# Patient Record
Sex: FEMALE | Race: WHITE | NOT HISPANIC OR LATINO | Employment: OTHER | URBAN - METROPOLITAN AREA
[De-identification: names, ages, dates, MRNs, and addresses within clinical notes are randomized per-mention and may not be internally consistent; named-entity substitution may affect disease eponyms.]

---

## 2017-06-28 ENCOUNTER — RECORDS - HEALTHEAST (OUTPATIENT)
Dept: LAB | Facility: CLINIC | Age: 72
End: 2017-06-28

## 2017-06-28 LAB
CHOLEST SERPL-MCNC: 207 MG/DL
FASTING STATUS PATIENT QL REPORTED: ABNORMAL
HDLC SERPL-MCNC: 68 MG/DL
LDLC SERPL CALC-MCNC: 121 MG/DL
TRIGL SERPL-MCNC: 89 MG/DL

## 2018-04-16 ENCOUNTER — RECORDS - HEALTHEAST (OUTPATIENT)
Dept: ADMINISTRATIVE | Facility: OTHER | Age: 73
End: 2018-04-16

## 2018-04-20 ENCOUNTER — RECORDS - HEALTHEAST (OUTPATIENT)
Dept: ADMINISTRATIVE | Facility: OTHER | Age: 73
End: 2018-04-20

## 2018-05-29 ENCOUNTER — RECORDS - HEALTHEAST (OUTPATIENT)
Dept: ADMINISTRATIVE | Facility: OTHER | Age: 73
End: 2018-05-29

## 2018-06-08 ENCOUNTER — RECORDS - HEALTHEAST (OUTPATIENT)
Dept: ADMINISTRATIVE | Facility: OTHER | Age: 73
End: 2018-06-08

## 2018-06-08 ENCOUNTER — RECORDS - HEALTHEAST (OUTPATIENT)
Dept: LAB | Facility: CLINIC | Age: 73
End: 2018-06-08

## 2018-06-08 LAB
ALBUMIN SERPL-MCNC: 4.5 G/DL (ref 3.5–5)
ALP SERPL-CCNC: 82 U/L (ref 45–120)
ALT SERPL W P-5'-P-CCNC: 14 U/L (ref 0–45)
ANION GAP SERPL CALCULATED.3IONS-SCNC: 12 MMOL/L (ref 5–18)
AST SERPL W P-5'-P-CCNC: 33 U/L (ref 0–40)
BILIRUB SERPL-MCNC: 0.5 MG/DL (ref 0–1)
BUN SERPL-MCNC: 16 MG/DL (ref 8–28)
CALCIUM SERPL-MCNC: 10.8 MG/DL (ref 8.5–10.5)
CHLORIDE BLD-SCNC: 96 MMOL/L (ref 98–107)
CHOLEST SERPL-MCNC: 210 MG/DL
CO2 SERPL-SCNC: 23 MMOL/L (ref 22–31)
CREAT SERPL-MCNC: 0.72 MG/DL (ref 0.6–1.1)
FASTING STATUS PATIENT QL REPORTED: ABNORMAL
GFR SERPL CREATININE-BSD FRML MDRD: >60 ML/MIN/1.73M2
GLUCOSE BLD-MCNC: 94 MG/DL (ref 70–125)
HDLC SERPL-MCNC: 85 MG/DL
LDLC SERPL CALC-MCNC: 111 MG/DL
POTASSIUM BLD-SCNC: 4.1 MMOL/L (ref 3.5–5)
PROT SERPL-MCNC: 8.2 G/DL (ref 6–8)
SODIUM SERPL-SCNC: 131 MMOL/L (ref 136–145)
T4 FREE SERPL-MCNC: 1.2 NG/DL (ref 0.7–1.8)
TRIGL SERPL-MCNC: 72 MG/DL
TSH SERPL DL<=0.005 MIU/L-ACNC: 0.63 UIU/ML (ref 0.3–5)

## 2018-06-14 LAB
B BURGDOR AB SER-IMP: NORMAL
LYME AB IGG BAND(S): NORMAL
LYME AB IGM BAND(S): NORMAL
LYME IGG BLOT: NEGATIVE
LYME IGM BLOT: NEGATIVE

## 2018-06-22 ENCOUNTER — RECORDS - HEALTHEAST (OUTPATIENT)
Dept: ADMINISTRATIVE | Facility: OTHER | Age: 73
End: 2018-06-22

## 2018-10-10 ENCOUNTER — RECORDS - HEALTHEAST (OUTPATIENT)
Dept: LAB | Facility: CLINIC | Age: 73
End: 2018-10-10

## 2018-10-10 LAB
ANION GAP SERPL CALCULATED.3IONS-SCNC: 7 MMOL/L (ref 5–18)
BUN SERPL-MCNC: 15 MG/DL (ref 8–28)
CALCIUM SERPL-MCNC: 10 MG/DL (ref 8.5–10.5)
CHLORIDE BLD-SCNC: 99 MMOL/L (ref 98–107)
CO2 SERPL-SCNC: 25 MMOL/L (ref 22–31)
CREAT SERPL-MCNC: 0.74 MG/DL (ref 0.6–1.1)
GFR SERPL CREATININE-BSD FRML MDRD: >60 ML/MIN/1.73M2
GLUCOSE BLD-MCNC: 82 MG/DL (ref 70–125)
POTASSIUM BLD-SCNC: 4.5 MMOL/L (ref 3.5–5)
SODIUM SERPL-SCNC: 131 MMOL/L (ref 136–145)

## 2019-01-18 ENCOUNTER — RECORDS - HEALTHEAST (OUTPATIENT)
Dept: ADMINISTRATIVE | Facility: OTHER | Age: 74
End: 2019-01-18

## 2019-02-08 ENCOUNTER — RECORDS - HEALTHEAST (OUTPATIENT)
Dept: ADMINISTRATIVE | Facility: OTHER | Age: 74
End: 2019-02-08

## 2019-03-21 ENCOUNTER — RECORDS - HEALTHEAST (OUTPATIENT)
Dept: ADMINISTRATIVE | Facility: OTHER | Age: 74
End: 2019-03-21

## 2019-03-22 ENCOUNTER — RECORDS - HEALTHEAST (OUTPATIENT)
Dept: ADMINISTRATIVE | Facility: OTHER | Age: 74
End: 2019-03-22

## 2019-03-25 ENCOUNTER — AMBULATORY - HEALTHEAST (OUTPATIENT)
Dept: VASCULAR SURGERY | Facility: CLINIC | Age: 74
End: 2019-03-25

## 2019-03-25 DIAGNOSIS — I87.2 VENOUS STASIS DERMATITIS: ICD-10-CM

## 2019-04-03 ENCOUNTER — OFFICE VISIT - HEALTHEAST (OUTPATIENT)
Dept: VASCULAR SURGERY | Facility: CLINIC | Age: 74
End: 2019-04-03

## 2019-04-03 ENCOUNTER — RECORDS - HEALTHEAST (OUTPATIENT)
Dept: VASCULAR ULTRASOUND | Facility: CLINIC | Age: 74
End: 2019-04-03

## 2019-04-03 DIAGNOSIS — L03.115 CELLULITIS OF RIGHT LEG: ICD-10-CM

## 2019-04-03 DIAGNOSIS — L03.115 CELLULITIS OF RIGHT LOWER LIMB: ICD-10-CM

## 2019-04-03 DIAGNOSIS — L97.811 VENOUS STASIS ULCER OF OTHER PART OF RIGHT LOWER LEG LIMITED TO BREAKDOWN OF SKIN WITH VARICOSE VEINS (H): ICD-10-CM

## 2019-04-03 DIAGNOSIS — I83.018 VARICOSE VEINS OF RIGHT LOWER EXTREMITY WITH ULCER OTHER PART OF LOWER LEG (CODE) (H): ICD-10-CM

## 2019-04-03 DIAGNOSIS — I87.2 VENOUS INSUFFICIENCY OF RIGHT LEG: ICD-10-CM

## 2019-04-03 DIAGNOSIS — L97.211 NON-PRESSURE CHRONIC ULCER OF RIGHT CALF LIMITED TO BREAKDOWN OF SKIN (H): ICD-10-CM

## 2019-04-03 DIAGNOSIS — I83.018 VENOUS STASIS ULCER OF OTHER PART OF RIGHT LOWER LEG LIMITED TO BREAKDOWN OF SKIN WITH VARICOSE VEINS (H): ICD-10-CM

## 2019-04-03 DIAGNOSIS — L97.211 VARICOSE VEINS OF RIGHT LOWER EXTREMITY WITH ULCER OF CALF LIMITED TO BREAKDOWN OF SKIN (H): ICD-10-CM

## 2019-04-03 DIAGNOSIS — I87.301 CHRONIC VENOUS HYPERTENSION, RIGHT: ICD-10-CM

## 2019-04-03 DIAGNOSIS — I83.012 VARICOSE VEINS OF RIGHT LOWER EXTREMITY WITH ULCER OF CALF LIMITED TO BREAKDOWN OF SKIN (H): ICD-10-CM

## 2019-04-03 DIAGNOSIS — L97.811 NON-PRESSURE CHRONIC ULCER OF OTHER PART OF RIGHT LOWER LEG LIMITED TO BREAKDOWN OF SKIN (H): ICD-10-CM

## 2019-04-03 DIAGNOSIS — I87.301 CHRONIC VENOUS HYPERTENSION (IDIOPATHIC) WITHOUT COMPLICATIONS OF RIGHT LOWER EXTREMITY: ICD-10-CM

## 2019-04-03 DIAGNOSIS — I83.012 VARICOSE VEINS OF RIGHT LOWER EXTREMITY WITH ULCER OF CALF (CODE) (H): ICD-10-CM

## 2019-04-03 DIAGNOSIS — I87.2 VENOUS INSUFFICIENCY (CHRONIC) (PERIPHERAL): ICD-10-CM

## 2019-04-03 ASSESSMENT — MIFFLIN-ST. JEOR: SCORE: 994.06

## 2019-04-07 LAB
BACTERIA SPEC CULT: ABNORMAL
BACTERIA SPEC CULT: ABNORMAL
GRAM STAIN RESULT: ABNORMAL
GRAM STAIN RESULT: ABNORMAL

## 2019-04-08 ENCOUNTER — AMBULATORY - HEALTHEAST (OUTPATIENT)
Dept: VASCULAR SURGERY | Facility: CLINIC | Age: 74
End: 2019-04-08

## 2019-04-08 ENCOUNTER — COMMUNICATION - HEALTHEAST (OUTPATIENT)
Dept: VASCULAR SURGERY | Facility: CLINIC | Age: 74
End: 2019-04-08

## 2019-04-08 DIAGNOSIS — Z22.322 MRSA (METHICILLIN RESISTANT STAPH AUREUS) CULTURE POSITIVE: ICD-10-CM

## 2019-04-08 DIAGNOSIS — B49 FUNGAL INFECTION: ICD-10-CM

## 2019-04-11 ENCOUNTER — COMMUNICATION - HEALTHEAST (OUTPATIENT)
Dept: VASCULAR SURGERY | Facility: CLINIC | Age: 74
End: 2019-04-11

## 2019-04-24 ENCOUNTER — OFFICE VISIT - HEALTHEAST (OUTPATIENT)
Dept: VASCULAR SURGERY | Facility: CLINIC | Age: 74
End: 2019-04-24

## 2019-04-24 DIAGNOSIS — L97.811 VENOUS STASIS ULCER OF OTHER PART OF RIGHT LOWER LEG LIMITED TO BREAKDOWN OF SKIN WITH VARICOSE VEINS (H): ICD-10-CM

## 2019-04-24 DIAGNOSIS — I83.018 VENOUS STASIS ULCER OF OTHER PART OF RIGHT LOWER LEG LIMITED TO BREAKDOWN OF SKIN WITH VARICOSE VEINS (H): ICD-10-CM

## 2019-04-24 DIAGNOSIS — L03.115 CELLULITIS OF RIGHT LEG: ICD-10-CM

## 2019-04-24 DIAGNOSIS — I83.012 VARICOSE VEINS OF RIGHT LOWER EXTREMITY WITH ULCER OF CALF LIMITED TO BREAKDOWN OF SKIN (H): ICD-10-CM

## 2019-04-24 DIAGNOSIS — I87.2 VENOUS INSUFFICIENCY OF RIGHT LEG: ICD-10-CM

## 2019-04-24 DIAGNOSIS — I87.301 CHRONIC VENOUS HYPERTENSION, RIGHT: ICD-10-CM

## 2019-04-24 DIAGNOSIS — M25.551 RIGHT HIP PAIN: ICD-10-CM

## 2019-04-24 DIAGNOSIS — R59.0 LYMPHADENOPATHY, INGUINAL: ICD-10-CM

## 2019-04-24 DIAGNOSIS — L97.211 VARICOSE VEINS OF RIGHT LOWER EXTREMITY WITH ULCER OF CALF LIMITED TO BREAKDOWN OF SKIN (H): ICD-10-CM

## 2019-04-24 ASSESSMENT — MIFFLIN-ST. JEOR: SCORE: 984.99

## 2019-04-30 ENCOUNTER — COMMUNICATION - HEALTHEAST (OUTPATIENT)
Dept: VASCULAR SURGERY | Facility: CLINIC | Age: 74
End: 2019-04-30

## 2019-05-29 ENCOUNTER — OFFICE VISIT - HEALTHEAST (OUTPATIENT)
Dept: VASCULAR SURGERY | Facility: CLINIC | Age: 74
End: 2019-05-29

## 2019-05-29 ENCOUNTER — RECORDS - HEALTHEAST (OUTPATIENT)
Dept: ADMINISTRATIVE | Facility: OTHER | Age: 74
End: 2019-05-29

## 2019-05-29 DIAGNOSIS — B49 FUNGAL INFECTION: ICD-10-CM

## 2019-05-29 DIAGNOSIS — I83.018 VENOUS STASIS ULCER OF OTHER PART OF RIGHT LOWER LEG LIMITED TO BREAKDOWN OF SKIN WITH VARICOSE VEINS (H): ICD-10-CM

## 2019-05-29 DIAGNOSIS — M25.551 RIGHT HIP PAIN: ICD-10-CM

## 2019-05-29 DIAGNOSIS — Z22.322 MRSA (METHICILLIN RESISTANT STAPH AUREUS) CULTURE POSITIVE: ICD-10-CM

## 2019-05-29 DIAGNOSIS — I87.2 VENOUS INSUFFICIENCY OF RIGHT LEG: ICD-10-CM

## 2019-05-29 DIAGNOSIS — L97.811 VENOUS STASIS ULCER OF OTHER PART OF RIGHT LOWER LEG LIMITED TO BREAKDOWN OF SKIN WITH VARICOSE VEINS (H): ICD-10-CM

## 2019-05-29 DIAGNOSIS — I87.301 CHRONIC VENOUS HYPERTENSION, RIGHT: ICD-10-CM

## 2019-05-29 DIAGNOSIS — I83.012 VARICOSE VEINS OF RIGHT LOWER EXTREMITY WITH ULCER OF CALF LIMITED TO BREAKDOWN OF SKIN (H): ICD-10-CM

## 2019-05-29 DIAGNOSIS — R59.0 LYMPHADENOPATHY, INGUINAL: ICD-10-CM

## 2019-05-29 DIAGNOSIS — L97.211 VARICOSE VEINS OF RIGHT LOWER EXTREMITY WITH ULCER OF CALF LIMITED TO BREAKDOWN OF SKIN (H): ICD-10-CM

## 2019-05-29 DIAGNOSIS — L03.115 CELLULITIS OF RIGHT LEG: ICD-10-CM

## 2019-05-29 ASSESSMENT — MIFFLIN-ST. JEOR: SCORE: 985.9

## 2019-06-04 ENCOUNTER — COMMUNICATION - HEALTHEAST (OUTPATIENT)
Dept: VASCULAR SURGERY | Facility: CLINIC | Age: 74
End: 2019-06-04

## 2019-07-02 ENCOUNTER — COMMUNICATION - HEALTHEAST (OUTPATIENT)
Dept: VASCULAR SURGERY | Facility: CLINIC | Age: 74
End: 2019-07-02

## 2019-07-24 ENCOUNTER — OFFICE VISIT - HEALTHEAST (OUTPATIENT)
Dept: VASCULAR SURGERY | Facility: CLINIC | Age: 74
End: 2019-07-24

## 2019-07-24 ENCOUNTER — RECORDS - HEALTHEAST (OUTPATIENT)
Dept: VASCULAR ULTRASOUND | Facility: CLINIC | Age: 74
End: 2019-07-24

## 2019-07-24 DIAGNOSIS — I83.891 SYMPTOMATIC VARICOSE VEINS OF RIGHT LOWER EXTREMITY: ICD-10-CM

## 2019-07-24 DIAGNOSIS — I87.2 VENOUS INSUFFICIENCY OF RIGHT LEG: ICD-10-CM

## 2019-07-24 DIAGNOSIS — I83.012 VARICOSE VEINS OF RIGHT LOWER EXTREMITY WITH ULCER OF CALF LIMITED TO BREAKDOWN OF SKIN (H): ICD-10-CM

## 2019-07-24 DIAGNOSIS — R59.0 LOCALIZED ENLARGED LYMPH NODES: ICD-10-CM

## 2019-07-24 DIAGNOSIS — L97.211 VARICOSE VEINS OF RIGHT LOWER EXTREMITY WITH ULCER OF CALF LIMITED TO BREAKDOWN OF SKIN (H): ICD-10-CM

## 2019-08-05 ENCOUNTER — COMMUNICATION - HEALTHEAST (OUTPATIENT)
Dept: VASCULAR SURGERY | Facility: CLINIC | Age: 74
End: 2019-08-05

## 2019-08-06 ENCOUNTER — COMMUNICATION - HEALTHEAST (OUTPATIENT)
Dept: SCHEDULING | Facility: CLINIC | Age: 74
End: 2019-08-06

## 2019-08-07 ENCOUNTER — COMMUNICATION - HEALTHEAST (OUTPATIENT)
Dept: VASCULAR SURGERY | Facility: CLINIC | Age: 74
End: 2019-08-07

## 2019-08-13 ENCOUNTER — COMMUNICATION - HEALTHEAST (OUTPATIENT)
Dept: VASCULAR SURGERY | Facility: CLINIC | Age: 74
End: 2019-08-13

## 2019-08-23 ENCOUNTER — RECORDS - HEALTHEAST (OUTPATIENT)
Dept: VASCULAR ULTRASOUND | Facility: CLINIC | Age: 74
End: 2019-08-23

## 2019-08-23 DIAGNOSIS — I83.891 VARICOSE VEINS OF RIGHT LOWER EXTREMITY WITH OTHER COMPLICATIONS: ICD-10-CM

## 2019-08-23 DIAGNOSIS — I83.012 VARICOSE VEINS OF RIGHT LOWER EXTREMITY WITH ULCER OF CALF (CODE) (H): ICD-10-CM

## 2019-08-23 DIAGNOSIS — L97.211 NON-PRESSURE CHRONIC ULCER OF RIGHT CALF LIMITED TO BREAKDOWN OF SKIN (H): ICD-10-CM

## 2019-08-23 DIAGNOSIS — I87.2 VENOUS INSUFFICIENCY (CHRONIC) (PERIPHERAL): ICD-10-CM

## 2019-08-28 ENCOUNTER — AMBULATORY - HEALTHEAST (OUTPATIENT)
Dept: VASCULAR SURGERY | Facility: CLINIC | Age: 74
End: 2019-08-28

## 2019-08-28 DIAGNOSIS — L98.491 CHRONIC SKIN ULCER, LIMITED TO BREAKDOWN OF SKIN (H): ICD-10-CM

## 2019-08-28 DIAGNOSIS — I87.2 VENOUS INSUFFICIENCY OF RIGHT LEG: ICD-10-CM

## 2019-08-30 ENCOUNTER — COMMUNICATION - HEALTHEAST (OUTPATIENT)
Dept: VASCULAR SURGERY | Facility: CLINIC | Age: 74
End: 2019-08-30

## 2019-09-04 ENCOUNTER — AMBULATORY - HEALTHEAST (OUTPATIENT)
Dept: VASCULAR SURGERY | Facility: CLINIC | Age: 74
End: 2019-09-04

## 2019-09-04 ENCOUNTER — RECORDS - HEALTHEAST (OUTPATIENT)
Dept: ADMINISTRATIVE | Facility: OTHER | Age: 74
End: 2019-09-04

## 2019-09-04 DIAGNOSIS — L98.491 CHRONIC SKIN ULCER, LIMITED TO BREAKDOWN OF SKIN (H): ICD-10-CM

## 2019-09-10 ENCOUNTER — OFFICE VISIT - HEALTHEAST (OUTPATIENT)
Dept: VASCULAR SURGERY | Facility: CLINIC | Age: 74
End: 2019-09-10

## 2019-09-10 DIAGNOSIS — I87.2 VENOUS INSUFFICIENCY OF RIGHT LEG: ICD-10-CM

## 2019-09-10 DIAGNOSIS — L97.211 VARICOSE VEINS OF RIGHT LOWER EXTREMITY WITH ULCER OF CALF LIMITED TO BREAKDOWN OF SKIN (H): ICD-10-CM

## 2019-09-10 DIAGNOSIS — I83.012 VARICOSE VEINS OF RIGHT LOWER EXTREMITY WITH ULCER OF CALF LIMITED TO BREAKDOWN OF SKIN (H): ICD-10-CM

## 2019-09-10 DIAGNOSIS — L98.491 CHRONIC SKIN ULCER, LIMITED TO BREAKDOWN OF SKIN (H): ICD-10-CM

## 2019-09-10 ASSESSMENT — MIFFLIN-ST. JEOR: SCORE: 972.29

## 2019-10-01 ENCOUNTER — OFFICE VISIT - HEALTHEAST (OUTPATIENT)
Dept: VASCULAR SURGERY | Facility: CLINIC | Age: 74
End: 2019-10-01

## 2019-10-01 ENCOUNTER — HEALTH MAINTENANCE LETTER (OUTPATIENT)
Age: 74
End: 2019-10-01

## 2019-10-01 DIAGNOSIS — I83.012 VARICOSE VEINS OF RIGHT LOWER EXTREMITY WITH ULCER OF CALF LIMITED TO BREAKDOWN OF SKIN (H): ICD-10-CM

## 2019-10-01 DIAGNOSIS — L98.491 CHRONIC SKIN ULCER, LIMITED TO BREAKDOWN OF SKIN (H): ICD-10-CM

## 2019-10-01 DIAGNOSIS — I87.301 CHRONIC VENOUS HYPERTENSION, RIGHT: ICD-10-CM

## 2019-10-01 DIAGNOSIS — Z22.322 MRSA (METHICILLIN RESISTANT STAPH AUREUS) CULTURE POSITIVE: ICD-10-CM

## 2019-10-01 DIAGNOSIS — L97.211 VARICOSE VEINS OF RIGHT LOWER EXTREMITY WITH ULCER OF CALF LIMITED TO BREAKDOWN OF SKIN (H): ICD-10-CM

## 2019-10-01 DIAGNOSIS — I83.891 SYMPTOMATIC VARICOSE VEINS OF RIGHT LOWER EXTREMITY: ICD-10-CM

## 2019-10-01 DIAGNOSIS — I87.2 VENOUS INSUFFICIENCY OF RIGHT LEG: ICD-10-CM

## 2019-10-02 LAB — KOH PREPARATION: NORMAL

## 2019-10-04 ENCOUNTER — COMMUNICATION - HEALTHEAST (OUTPATIENT)
Dept: VASCULAR SURGERY | Facility: CLINIC | Age: 74
End: 2019-10-04

## 2019-10-07 ENCOUNTER — COMMUNICATION - HEALTHEAST (OUTPATIENT)
Dept: VASCULAR SURGERY | Facility: CLINIC | Age: 74
End: 2019-10-07

## 2019-10-23 ENCOUNTER — OFFICE VISIT - HEALTHEAST (OUTPATIENT)
Dept: VASCULAR SURGERY | Facility: CLINIC | Age: 74
End: 2019-10-23

## 2019-10-23 DIAGNOSIS — I87.2 VENOUS INSUFFICIENCY OF RIGHT LEG: ICD-10-CM

## 2019-10-23 DIAGNOSIS — I83.891 SYMPTOMATIC VARICOSE VEINS OF RIGHT LOWER EXTREMITY: ICD-10-CM

## 2019-10-23 DIAGNOSIS — Z22.322 MRSA (METHICILLIN RESISTANT STAPH AUREUS) CULTURE POSITIVE: ICD-10-CM

## 2019-10-23 DIAGNOSIS — L98.491 CHRONIC SKIN ULCER, LIMITED TO BREAKDOWN OF SKIN (H): ICD-10-CM

## 2019-10-23 DIAGNOSIS — I83.012 VARICOSE VEINS OF RIGHT LOWER EXTREMITY WITH ULCER OF CALF LIMITED TO BREAKDOWN OF SKIN (H): ICD-10-CM

## 2019-10-23 DIAGNOSIS — L97.211 VARICOSE VEINS OF RIGHT LOWER EXTREMITY WITH ULCER OF CALF LIMITED TO BREAKDOWN OF SKIN (H): ICD-10-CM

## 2019-10-23 DIAGNOSIS — I87.301 CHRONIC VENOUS HYPERTENSION, RIGHT: ICD-10-CM

## 2019-11-14 ENCOUNTER — RECORDS - HEALTHEAST (OUTPATIENT)
Dept: LAB | Facility: CLINIC | Age: 74
End: 2019-11-14

## 2019-11-14 LAB
ANION GAP SERPL CALCULATED.3IONS-SCNC: 8 MMOL/L (ref 5–18)
BUN SERPL-MCNC: 14 MG/DL (ref 8–28)
CALCIUM SERPL-MCNC: 9.7 MG/DL (ref 8.5–10.5)
CHLORIDE BLD-SCNC: 97 MMOL/L (ref 98–107)
CO2 SERPL-SCNC: 25 MMOL/L (ref 22–31)
CREAT SERPL-MCNC: 0.71 MG/DL (ref 0.6–1.1)
GFR SERPL CREATININE-BSD FRML MDRD: >60 ML/MIN/1.73M2
GLUCOSE BLD-MCNC: 92 MG/DL (ref 70–125)
POTASSIUM BLD-SCNC: 5.1 MMOL/L (ref 3.5–5)
SODIUM SERPL-SCNC: 130 MMOL/L (ref 136–145)
T4 FREE SERPL-MCNC: 1.2 NG/DL (ref 0.7–1.8)
TSH SERPL DL<=0.005 MIU/L-ACNC: 0.17 UIU/ML (ref 0.3–5)

## 2019-12-15 ENCOUNTER — HEALTH MAINTENANCE LETTER (OUTPATIENT)
Age: 74
End: 2019-12-15

## 2020-01-22 ENCOUNTER — OFFICE VISIT - HEALTHEAST (OUTPATIENT)
Dept: VASCULAR SURGERY | Facility: CLINIC | Age: 75
End: 2020-01-22

## 2020-01-22 ENCOUNTER — RECORDS - HEALTHEAST (OUTPATIENT)
Dept: ADMINISTRATIVE | Facility: OTHER | Age: 75
End: 2020-01-22

## 2020-01-22 DIAGNOSIS — I83.891 SYMPTOMATIC VARICOSE VEINS OF RIGHT LOWER EXTREMITY: ICD-10-CM

## 2020-01-22 DIAGNOSIS — L98.491 CHRONIC SKIN ULCER, LIMITED TO BREAKDOWN OF SKIN (H): ICD-10-CM

## 2020-01-22 DIAGNOSIS — I87.2 VENOUS INSUFFICIENCY OF RIGHT LEG: ICD-10-CM

## 2020-02-05 ENCOUNTER — RECORDS - HEALTHEAST (OUTPATIENT)
Dept: ADMINISTRATIVE | Facility: OTHER | Age: 75
End: 2020-02-05

## 2020-11-19 ENCOUNTER — RECORDS - HEALTHEAST (OUTPATIENT)
Dept: LAB | Facility: CLINIC | Age: 75
End: 2020-11-19

## 2020-11-19 LAB
ALBUMIN SERPL-MCNC: 4.5 G/DL (ref 3.5–5)
ALP SERPL-CCNC: 117 U/L (ref 45–120)
ALT SERPL W P-5'-P-CCNC: 21 U/L (ref 0–45)
ANION GAP SERPL CALCULATED.3IONS-SCNC: 9 MMOL/L (ref 5–18)
AST SERPL W P-5'-P-CCNC: 33 U/L (ref 0–40)
BILIRUB SERPL-MCNC: 0.6 MG/DL (ref 0–1)
BUN SERPL-MCNC: 12 MG/DL (ref 8–28)
CALCIUM SERPL-MCNC: 9.5 MG/DL (ref 8.5–10.5)
CHLORIDE BLD-SCNC: 94 MMOL/L (ref 98–107)
CHOLEST SERPL-MCNC: 206 MG/DL
CO2 SERPL-SCNC: 24 MMOL/L (ref 22–31)
CREAT SERPL-MCNC: 0.69 MG/DL (ref 0.6–1.1)
FASTING STATUS PATIENT QL REPORTED: ABNORMAL
GFR SERPL CREATININE-BSD FRML MDRD: >60 ML/MIN/1.73M2
GLUCOSE BLD-MCNC: 95 MG/DL (ref 70–125)
HDLC SERPL-MCNC: 85 MG/DL
LDLC SERPL CALC-MCNC: 106 MG/DL
POTASSIUM BLD-SCNC: 4.2 MMOL/L (ref 3.5–5)
PROT SERPL-MCNC: 7.7 G/DL (ref 6–8)
SODIUM SERPL-SCNC: 127 MMOL/L (ref 136–145)
T4 FREE SERPL-MCNC: 1.1 NG/DL (ref 0.7–1.8)
TRIGL SERPL-MCNC: 74 MG/DL
TSH SERPL DL<=0.005 MIU/L-ACNC: 1.14 UIU/ML (ref 0.3–5)

## 2021-01-01 ENCOUNTER — HEALTH MAINTENANCE LETTER (OUTPATIENT)
Age: 76
End: 2021-01-01

## 2021-01-01 VITALS
WEIGHT: 118 LBS | DIASTOLIC BLOOD PRESSURE: 80 MMHG | HEART RATE: 76 BPM | BODY MASS INDEX: 21.76 KG/M2 | SYSTOLIC BLOOD PRESSURE: 120 MMHG | TEMPERATURE: 98.2 F

## 2021-01-01 VITALS
WEIGHT: 114.2 LBS | DIASTOLIC BLOOD PRESSURE: 82 MMHG | RESPIRATION RATE: 12 BRPM | HEART RATE: 72 BPM | TEMPERATURE: 98.9 F | HEIGHT: 62 IN | BODY MASS INDEX: 21.02 KG/M2 | SYSTOLIC BLOOD PRESSURE: 150 MMHG

## 2021-01-01 VITALS — HEIGHT: 62 IN | BODY MASS INDEX: 21.9 KG/M2 | WEIGHT: 119 LBS

## 2021-01-01 VITALS — BODY MASS INDEX: 21.53 KG/M2 | WEIGHT: 117 LBS | HEIGHT: 62 IN

## 2021-01-01 VITALS — BODY MASS INDEX: 21.57 KG/M2 | HEIGHT: 62 IN | WEIGHT: 117.2 LBS

## 2021-01-15 ENCOUNTER — HEALTH MAINTENANCE LETTER (OUTPATIENT)
Age: 76
End: 2021-01-15

## 2021-04-07 ENCOUNTER — RECORDS - HEALTHEAST (OUTPATIENT)
Dept: LAB | Facility: CLINIC | Age: 76
End: 2021-04-07

## 2021-04-07 LAB
ALBUMIN SERPL-MCNC: 4.5 G/DL (ref 3.5–5)
ALP SERPL-CCNC: 96 U/L (ref 45–120)
ALT SERPL W P-5'-P-CCNC: 18 U/L (ref 0–45)
ANION GAP SERPL CALCULATED.3IONS-SCNC: 13 MMOL/L (ref 5–18)
AST SERPL W P-5'-P-CCNC: 31 U/L (ref 0–40)
BILIRUB SERPL-MCNC: 0.3 MG/DL (ref 0–1)
BUN SERPL-MCNC: 17 MG/DL (ref 8–28)
CALCIUM SERPL-MCNC: 9.6 MG/DL (ref 8.5–10.5)
CHLORIDE BLD-SCNC: 98 MMOL/L (ref 98–107)
CO2 SERPL-SCNC: 24 MMOL/L (ref 22–31)
CREAT SERPL-MCNC: 0.75 MG/DL (ref 0.6–1.1)
ERYTHROCYTE [DISTWIDTH] IN BLOOD BY AUTOMATED COUNT: 12.1 % (ref 11–14.5)
GFR SERPL CREATININE-BSD FRML MDRD: >60 ML/MIN/1.73M2
GLUCOSE BLD-MCNC: 78 MG/DL (ref 70–125)
HCT VFR BLD AUTO: 36.8 % (ref 35–47)
HGB BLD-MCNC: 12.2 G/DL (ref 12–16)
MCH RBC QN AUTO: 31.3 PG (ref 27–34)
MCHC RBC AUTO-ENTMCNC: 33.2 G/DL (ref 32–36)
MCV RBC AUTO: 94 FL (ref 80–100)
PLATELET # BLD AUTO: 290 THOU/UL (ref 140–440)
PMV BLD AUTO: 9.6 FL (ref 8.5–12.5)
POTASSIUM BLD-SCNC: 3.9 MMOL/L (ref 3.5–5)
PROT SERPL-MCNC: 7.6 G/DL (ref 6–8)
RBC # BLD AUTO: 3.9 MILL/UL (ref 3.8–5.4)
SODIUM SERPL-SCNC: 135 MMOL/L (ref 136–145)
TSH SERPL DL<=0.005 MIU/L-ACNC: 0.36 UIU/ML (ref 0.3–5)
WBC: 11.1 THOU/UL (ref 4–11)

## 2021-04-08 ENCOUNTER — RECORDS - HEALTHEAST (OUTPATIENT)
Dept: ADMINISTRATIVE | Facility: OTHER | Age: 76
End: 2021-04-08

## 2021-04-08 LAB
PATIENT'S ETHNICITY: NORMAL
PATIENT'S RACE: NORMAL
SARS-COV-2 AB SERPL QL IA: NEGATIVE

## 2021-04-14 ENCOUNTER — HOSPITAL ENCOUNTER (OUTPATIENT)
Dept: NUCLEAR MEDICINE | Facility: HOSPITAL | Age: 76
Discharge: HOME | End: 2021-04-14
Attending: FAMILY MEDICINE

## 2021-04-14 ENCOUNTER — HOSPITAL ENCOUNTER (OUTPATIENT)
Dept: CARDIOLOGY | Facility: HOSPITAL | Age: 76
Discharge: HOME | End: 2021-04-14
Attending: FAMILY MEDICINE

## 2021-04-14 DIAGNOSIS — R06.02 SOB (SHORTNESS OF BREATH): ICD-10-CM

## 2021-04-14 LAB
CV STRESS CURRENT BP HE: NORMAL
CV STRESS CURRENT HR HE: 106
CV STRESS CURRENT HR HE: 106
CV STRESS CURRENT HR HE: 108
CV STRESS CURRENT HR HE: 109
CV STRESS CURRENT HR HE: 113
CV STRESS CURRENT HR HE: 115
CV STRESS CURRENT HR HE: 72
CV STRESS CURRENT HR HE: 78
CV STRESS CURRENT HR HE: 83
CV STRESS CURRENT HR HE: 85
CV STRESS CURRENT HR HE: 89
CV STRESS CURRENT HR HE: 91
CV STRESS CURRENT HR HE: 92
CV STRESS CURRENT HR HE: 92
CV STRESS CURRENT HR HE: 95
CV STRESS CURRENT HR HE: 96
CV STRESS DEVIATION TIME HE: NORMAL
CV STRESS ECHO PERCENT HR HE: NORMAL
CV STRESS EXERCISE STAGE HE: NORMAL
CV STRESS FINAL RESTING BP HE: NORMAL
CV STRESS FINAL RESTING HR HE: 85
CV STRESS MAX HR HE: 115
CV STRESS MAX TREADMILL GRADE HE: 0
CV STRESS MAX TREADMILL SPEED HE: 0
CV STRESS PEAK DIA BP HE: NORMAL
CV STRESS PEAK SYS BP HE: NORMAL
CV STRESS PHASE HE: NORMAL
CV STRESS PROTOCOL HE: NORMAL
CV STRESS RESTING PT POSITION HE: NORMAL
CV STRESS ST DEVIATION AMOUNT HE: NORMAL
CV STRESS ST DEVIATION ELEVATION HE: NORMAL
CV STRESS ST EVELATION AMOUNT HE: NORMAL
CV STRESS TEST TYPE HE: NORMAL
CV STRESS TOTAL STAGE TIME MIN 1 HE: NORMAL
NUC REST EJECTION FRACTION: 75 %
RATE PRESSURE PRODUCT: NORMAL
STRESS ECHO BASELINE DIASTOLIC HE: 95
STRESS ECHO BASELINE HR: 80
STRESS ECHO BASELINE SYSTOLIC BP: 218
STRESS ECHO CALCULATED PERCENT HR: 78 %
STRESS ECHO LAST STRESS DIASTOLIC BP: 91
STRESS ECHO LAST STRESS HR: 106
STRESS ECHO LAST STRESS SYSTOLIC BP: 186
STRESS ECHO TARGET HR: 147

## 2021-05-26 VITALS
RESPIRATION RATE: 16 BRPM | DIASTOLIC BLOOD PRESSURE: 80 MMHG | SYSTOLIC BLOOD PRESSURE: 138 MMHG | TEMPERATURE: 98.6 F | HEART RATE: 76 BPM

## 2021-05-26 VITALS — TEMPERATURE: 97.9 F | DIASTOLIC BLOOD PRESSURE: 62 MMHG | HEART RATE: 78 BPM | SYSTOLIC BLOOD PRESSURE: 126 MMHG

## 2021-05-26 VITALS — HEART RATE: 88 BPM | SYSTOLIC BLOOD PRESSURE: 140 MMHG | DIASTOLIC BLOOD PRESSURE: 80 MMHG | TEMPERATURE: 98 F

## 2021-05-27 NOTE — TELEPHONE ENCOUNTER
Called patient to let her know results from No's note below.      ----- Message from No Zuluaga NP sent at 4/8/2019  8:05 AM CDT -----  Can you call the patient and let her know that her culture grew out mrsa this is a type of bacteria that is resistant to several antibiotics; we will have to stop the keflex antibiotic and switch her to bactrim two times a day; she should continue her topical antibiotics as well. She also had fungus present so I also sent a prescription for clotrimazole cream; this is typically otc; but I sent an rx for it see if it will be covered; apply to the red and inflamed area but not any overt ulcerated areas.

## 2021-05-28 NOTE — TELEPHONE ENCOUNTER
"Pt called and stated that [employer] called her to inform her that her wound care supplies would not be covered because \"No did not fill out the paperwork\". Writer informed pt that the paperwork was filled but she has no open wounds so insurance will probably not cover the supplies.   "

## 2021-05-29 NOTE — PATIENT INSTRUCTIONS - HE
"We would like you to purchase a pair of the [employer] Walker brand stockings from [employer]. You can order them online or by calling the toll free number.  Your leg measurements are provided below and you will have to let the [employer] representative know these measurments to get the correct size for you .  Website: [email or web address]  Toll-free: [phone]  Hours: Mon-Thur 8:30am-8pm, Friday 8:30am-7pm, Sat 9am-4pm    Vasc Edema 4/3/2019   Right just above MTP 18.7   Right Ankle 20.5   Right Widest Calf 35   Left - just above MTP 19   Left Ankle 17.5   Left Widest Calf 29.3       Swelling in the legs can be caused by many reasons. No matter what the reason, treatment usually includes some type of compression. You should wear your compression socks as much as you can. Your compression should be put on first thing in the morning. Take the compression off at night. It is especially important to wear them with long periods of sitting/standing, long car rides or if you will be flying. Going without compression for even brief periods of time can be damaging to your legs and your health.  Compression socks should get replaced every 4-6 months. They do not need to be worn at night while in bed. Call us with any problems or questions. If you do a lot of standing, it is good to do calf raises to help keep the blood pumping. If you sit a lot at work, it is good to get up periodically to walk around. Elevation of the foot of your bed 4-6\" helps the blood return back to where it is needed.     Please call us if you have any questions [phone]    Thank you for choosing [facility].      No dressings; no ointments  Ok to shower      "

## 2021-05-29 NOTE — TELEPHONE ENCOUNTER
Pt wanted to know if she had to get the compression stockings from the [employer] catalog. Writer informed her that she can get them wherever she would like. Knee high.

## 2021-05-30 NOTE — TELEPHONE ENCOUNTER
Pt called and stated that she still has some R leg weeping. No open wounds. She is wearing compression stockings. She was wondering if she should see No again. Pt was seen on 5/29/19 and the same symptoms were present. Writer advised that she see Dr. Silveira on 7/24/19. Also asked her if she wanted to schedule sooner with No and she declined at this time.

## 2021-05-30 NOTE — PATIENT INSTRUCTIONS - HE
Varicose Vein Pre-Procedure Instructions    You are scheduled for a varicose vein treatment on your legs. The following is some helpful information for you in regards to your treatment.    **Important:  A  will be needed post procedure. We will supply a thigh high compression stocking for you unless if you have one.  Please be aware, it is not advised to fly within 3 weeks post procedure    Please wear comfortable clothing.  We recommend that you bring a change of under clothes; they may get stained by the cleansing solution.    Feel free to bring a personal music player or a CD to listen to during your procedure.    Take your routine medications as you normally would.    It is ok to eat prior to this procedure.    Please allow 1- 2 hours for your appointment.    For any questions regarding your procedure please call   [phone] to speak with the nurse.    If you would like a Good [employer] Estimate for your upcoming service/procedure contact Cost of Care Estimates at [phone], advocates are available Monday through Friday 8am - 5pm.    Please have the following information available:  1. Patient name and date of birth  2. Insurance company, plan name, ID and group numbers  3. Description of the service/procedure and the associated procedure code numbers, if available. If more than one (1) procedure code, indicate which will be the primary procedure code.   4. The facility where the service/procedure will be performed.  5. The name of the physician involved with the service/procedure.  6. Appointment date of service.  7. Telephone number to call with the information.    Continue previous wound care and compression.

## 2021-05-30 NOTE — PROGRESS NOTES
LK patient/right leg weeping/swelling/rash/symp vv/,USdone 4/3/19 right GSV RFA option,2 enlarged lymph nodes noted in right calf was to have repeat US in 3 months never f/u. >3 months compression socks.Insurance Medicare/[employer]. SYMP VV, WOUND RIGHT leg weeping areas improved. Swelling improved with compression. Complains of leg pain at a 5 sometimes worse and sharp when walking at vein area.Right ankle fracture 1963.Cast for 3 months.Also had a hip injury.  Only one lymph noted on right calf now . Reviewed US right GSV 4/19 RFA option will preauth.Continue wound care as NP ordered and compression.

## 2021-05-31 NOTE — PATIENT INSTRUCTIONS - HE
- Please call us if your compression wraps fall more than 1-2 inches below the bend of the knee. Call if they are too painful. Call if they get wet. If it is a weekend and the wraps fall down, are too painful, or get wet take the wraps off and put on another form of compression. Compression such as velcro wraps, compression stockings, short stretch bandages, or tubular compression. Apply one of these until you can be seen in clinic. Please call us if you have any questions [phone].    - Treatment:  Layered Compression Bandaging (2-layer)    What is it?  The layered compression bandaging has a layer of absorbent material that will soak up drainage.     Why we do it.   This is done to treat swelling, wounds, or both.  This will in turn help circulation and healing.    How to care for your bandages.  The wraps need to be kept dry. If  the wraps become wet, remove them and call the clinic to have another wrap applied.    What to expect.  It is common for the wraps to be uncomfortable at the beginning. The first two days are usually the hardest; then they will become more comfortable.       Elevating your legs will help the discomfort. Try to elevate your legs as much as possible.    If rest and elevation does not help your discomfort, call your provider.  If your provider is not available you can remove the wrap and leave a message for further instructions.    - Swelling and Compression Therapy    Swelling in the legs can be caused by many reasons. No matter what the reason, treatment usually includes some type of compression. This may be done with a support sock, dressing, ace wrap, or layered wraps.     It is important to treat the swelling for many reasons. If the swelling is not treated you may develop blisters that can lead to ulcerations. This is caused when extra fluid goes into tissue causing damage and blocking blood flow to the tissue.     It is important that you wear your compression every day,  including days that you will be seen in clinic.     Compression is often the most important part of treating leg wounds. Without controlling the swelling it is often not possible to heal wounds.     Going without compression for even brief periods of time can be damaging to your legs and your health.  Your compression should be put on first thing in the morning. Take the compression off at night only when instructed by your care provider to do so. Sometimes wearing compression 24 hours a day will be recommended.       If you are having difficulty wearing your compression it is important to notify your care provider so that other options may be reviewed.    Thank you for choosing [facility]. Please call us if you have any questions [phone].

## 2021-05-31 NOTE — PROGRESS NOTES
Clinic Care Coordination Contact    Clinic Care Coordination Contact  OUTREACH    Referral Information:  Referral Source: [employer] Outreach    Call to patient.  Her cyst has cleared up.  Discussed role of  and opportunities for support.   Current Behavioral Concerns: Patient's  has lived in memory care for 8 years at [facility].  She has support for this role. Her current stress is related to her son who is living with her after losing his housing after getting a DUI. He is depressed and seeing a psychiatrist and is on medications. She had to get him [facility] support and is on MA and SNAP.  She let him move in with her as he was homeless.  This is not good for him or her, but until he can get his life on track, she will allow it.  He is dealing with legal consequences.  She has talked with JACIEL. Is trying to set boundaries, yet support him.    Education Provided to patient: Reviewed role of .       Advance Care Plan/Directive  Advanced Care Plans/Directives on file:: No    Patient/Caregiver understanding: Patient appreciated the call and would like to call SW if needs arise.       Plan: SW will send letter with information.  No further outreach by BRENDAN.      SONIA Flynn  [facility]  [phone]

## 2021-05-31 NOTE — TELEPHONE ENCOUNTER
Jen called back with questions regarding her RFA procedure on Wednesday 8/21. Went over instructions. She is wondering if she can bring her own thigh high stocking. Let her know she can but that we will also have them in clinic.

## 2021-05-31 NOTE — TELEPHONE ENCOUNTER
Pt called and left a message requesting a call back from Moisés. Writer left pt a message to call back with any questions- Moisés is not in today.

## 2021-05-31 NOTE — TELEPHONE ENCOUNTER
Left message  patient regarding vein ablation procedure next week. Advised to bring a . Explained it is ok to eat and drink prior to procedure with exception of blood thinner. We will supply patient with a thigh high compression sock. We carry from size medium to extra large.If patient doesn't fit into them they will need to provide their own. . Call if any further questions.

## 2021-05-31 NOTE — TELEPHONE ENCOUNTER
One of the incisions sites from her RFA is sore - advised her to take tylenol/ibu and ice the area, along with elevating her legs.

## 2021-05-31 NOTE — TELEPHONE ENCOUNTER
Pt wanted to know when she would be able to schedule the ablation. Writer informed her that the PA was submitted on 7/30/19 and the office has up to 30 days for prior authorizations. She will be called to scheduled if she is approved.

## 2021-06-01 NOTE — PATIENT INSTRUCTIONS - HE
Resume normal activity with exception of no flight for one more week.  Use compression socks as much as possible when on feet, long car rides and on air planes.  Return to clinic in 4 months.  Call if any questions [phone]   Continue daily wound care of bactroban/abd/compression sock.   Wash with saline and pat dry.

## 2021-06-01 NOTE — PATIENT INSTRUCTIONS - HE
Sent skin sample for yeast testing    Sent culture of the wound; this will take 5 days to get results we will call you with next steps    Sent refills to your pharmacy for Triamcinolone ointment and Bactroban ointment      Wound Care Instructions    bid Cleanse your right medial ankle wound(s) with Normal Saline     Pat Dry    Apply mixture of Triamcinolone ointment and bactroban ointment into/onto the wounds    Cover with viscopaste; ABD    Secure with roll gauze as needed    Compression: tubular compression and short stretch to the right    It is not ok to get your wound wet in the bath or shower; cover with saran wrap; do wound cares right after showering    SEEK MEDICAL CARE IF:    You have an increase in swelling, pain, or redness around the wound.    You have an increase in the amount of pus coming from the wound.    There is a bad smell coming from the wound.    The wound appears to be worsening/enlarging    You have a fever greater than 101.5 F      No Zuluaga DNP, RN, CNP, [facility]  [facility]  [phone]

## 2021-06-01 NOTE — PROGRESS NOTES
2 week f/u right GSV RFA. Wound continues.Comes in with ace wrap entire leg and bactroban and gauze over wounds. Patient refuses compression 2 layer made leg itch. Cotton cast padding did help. Dressings removed. Wounds cleansed and pat dry. Applied bactroban,abd,roll gauze and compression.  Advised to continue compression knee high and resume normal activity. RTC with NP in 3 weeks and KW in 4 months.

## 2021-06-01 NOTE — PATIENT INSTRUCTIONS - HE
- Please call us if your compression wraps fall more than 1-2 inches below the bend of the knee. Remove the wraps if you experience any shortness of breathe or notice your toes turning blue/purple and then give us a call. Call if they are too painful. Call if they get wet. If it is a weekend and the wraps fall down, are too painful, or get wet take the wraps off and put on another form of compression. Compression such as velcro wraps, compression stockings, short stretch bandages, or tubular compression. Apply one of these until you can be seen in clinic. Please call us if you have any questions [phone].    - Treatment:  Layered Compression Bandaging (2-layer)    What is it?  The layered compression bandaging has a layer of absorbent material that will soak up drainage.     Why we do it.   This is done to treat swelling, wounds, or both.  This will in turn help circulation and healing.    How to care for your bandages.  The wraps need to be kept dry. If  the wraps become wet, remove them and call the clinic to have another wrap applied.    What to expect.  It is common for the wraps to be uncomfortable at the beginning. The first two days are usually the hardest; then they will become more comfortable.       Elevating your legs will help the discomfort. Try to elevate your legs as much as possible.    If rest and elevation does not help your discomfort, call your provider.  If your provider is not available you can remove the wrap and leave a message for further instructions.    - Swelling and Compression Therapy    Swelling in the legs can be caused by many reasons. No matter what the reason, treatment usually includes some type of compression. This may be done with a support sock, dressing, ace wrap, or layered wraps.     It is important to treat the swelling for many reasons. If the swelling is not treated you may develop blisters that can lead to ulcerations. This is caused when extra fluid goes into tissue  causing damage and blocking blood flow to the tissue.     It is important that you wear your compression every day, including days that you will be seen in clinic.     Compression is often the most important part of treating leg wounds. Without controlling the swelling it is often not possible to heal wounds.     Going without compression for even brief periods of time can be damaging to your legs and your health.  Your compression should be put on first thing in the morning. Take the compression off at night only when instructed by your care provider to do so. Sometimes wearing compression 24 hours a day will be recommended.       If you are having difficulty wearing your compression it is important to notify your care provider so that other options may be reviewed.    Thank you for choosing [facility]. Please call us if you have any questions [phone].

## 2021-06-01 NOTE — PROGRESS NOTES
Post Procedure Note Endovenous Closure    S: Jen Willingham is a 74 y.o. female S/P Right  leg endovenous closure ofRight lower ext. Two weeks out from procedure. Doing well, wore female  thigh high socks. Minimal discomfort. Some superficial phlibitis. Which is resolving.     O: There were no vitals filed for this visit.    General: no apparent distress  Legs look good no signs of infection, incisions healing nicely.  Wound continue. Two layer removed       US Venous Post Ablation Leg Right (Order [id number])   Imaging   Date: 8/23/2019 Department: [facility] [place] Released By: Brandi Santo Authorizing: Delvis Silveira MD   Study Result     [facility]  LOCATION: [facility]  DATE: 8/23/2019     EXAM: US VENOUS POST ABLATION LEG RIGHT      INDICATION: Symptomatic varicose veins status post endovenous ablation.     COMPARISON: 8/21/2019.     TECHNIQUE: Duplex imaging is performed utilizing gray-scale, two-dimensional images, and color-flow imaging. Doppler waveform analysis and spectral Doppler imaging is also performed.     FINDINGS:   The right  great saphenous vein is occluded from the proximal thigh through the knee.         A/P: S/p endovenous closure. For insuffiencey of veins   Wounds cleaned up and bactroban and guaze appled. Back in compression   Follow up with NP in  weeks    May switch to knee high support socks   Resume all activities   RTC 4 months   Call for any questions or concerns     Delvis Silveira MD   [facility]

## 2021-06-02 NOTE — PATIENT INSTRUCTIONS - HE
Wounds and rash are healed  Stop all ointments  Stop all dressings    We will put on double tubular compression today; until you get home and then wear your compression stocking    Continue to wear your compression stockings or velcro wraps every day; put them on first thing in the morning and remove at bedtime    Replace your compression stockings every 3-4 months; these garments will lose their elasticity and become ineffective    Replace velcro wraps every 1-2 years    Elevate your legs periodically throughout the day, 30-60 minutes 1-3 times per day    Apply lotion to your legs 1-2 times per day; some good name brands are Cetaphil, Sarna, Aveeno, VaniCream    Continue to walk and exercise    If you are taking a diuretic continue to do so at the direction of your primary care provider    Make an appointment at the vascular clinic again if you have worsening swelling, need a prescription for new compression garments; and/or develop new wounds

## 2021-06-02 NOTE — TELEPHONE ENCOUNTER
Confirming medication with No Zuluaga NP    Gentle myosin vs gentamicin      ----- Message from No Zuluaga NP sent at 10/4/2019  1:50 PM CDT -----  Can you call the patient and let her know that her culture grew out pseudomonas we will have to add another topical antibiotic ointment gentle myosin I am having troubles on my end ordering this through [employer] so if one of you could please take a verbal that would be great she should mix this with her bactroban and triamcinolone daily twice daily

## 2021-06-02 NOTE — TELEPHONE ENCOUNTER
Spoke with patient and gave her results from LK's note below       ----- Message from No Zuluaga NP sent at 10/7/2019  7:41 AM CDT -----  Please call the paitent and let her know that her culture grew out pseudomonas and I sent rx for topical gentamycin add this to the bact/TCM

## 2021-06-05 NOTE — PROGRESS NOTES
[facility] Surgery Follow up    HPI:    74 y.o. year old female who returns for a follow up. S/P closure back in July. Doing well. Continued issues with ulcer but swelling improved and ulcers have also improved.     Allergies:Hydroxychloroquine; Latanoprost; and Other environmental allergy    Past Medical History:   Diagnosis Date     Abnormal thyroid function test 11/19/2012     Allergic rhinitis due to other allergen 3/27/2019     Asymptomatic postmenopausal status 3/27/2019    Overview:         stopped HRT in 2002, menopause 1996 - age 51 Problem list name updated by automated process. Provider to review     Essential hypertension 3/27/2019    Overview:  Normal stress ECHO 12/2001 Labile - good control generally, but times when increases for a few days to 160-200/'s - see ED visit Problem list name updated by automated process. 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       biotin 5,000 mcg TbDL Take by mouth.       calcium-vitamin D 500 mg(1,250mg) -200 unit per tablet Take by mouth.       coenzyme Q10 100 mg capsule Take 100 mg by mouth.       levothyroxine (SYNTHROID, LEVOTHROID) 75 MCG tablet Take 75 mcg by mouth.       losartan (COZAAR) 100 MG tablet 1 tab(s) once a day orally  3     multivitamin therapeutic tablet Take by mouth.       mupirocin (BACTROBAN) 2 % ointment Apply topically to wound twice per day 30 g 0     omega-3 acid ethyl esters (LOVAZA) 1 gram capsule Take 4 g by mouth.       timolol maleate (TIMOPTIC) 0.5 % ophthalmic solution INSTILL ONE DROP IN EACH  EYE IN THE MORNING  6     turmeric root extract 500 mg cap Take by mouth.       aspirin 81 MG EC tablet Take 81 mg by mouth.       gentamicin (GARAMYCIN) 0.1 % ointment Apply topically to wound daily 15 g 0     triamcinolone (KENALOG) 0.5 % ointment Apply to right leg rash two times a day for 10 days 30 g 0     No current facility-administered medications on file prior to visit.        Family History   Problem Relation Age of Onset     Arthritis Mother      Osteoarthritis Mother      Varicose Veins Mother      Heart disease Father      Coronary artery disease Sister         reports that she has never smoked. She has never used smokeless tobacco. She reports current alcohol use. She reports that she does not use drugs.    Review of Systems:  Negative except chroinc venous issues and ulcers  Otherwise twelve system of review is negative.      OBJECTIVE:  Vitals:    01/22/20 0907   BP: 120/80   Patient Site: Left Arm   Patient Position: Sitting   Cuff Size: Adult Regular   Pulse: 76   Temp: 98.2  F (36.8  C)   TempSrc: Oral   Weight: 118 lb (53.5 kg)     Body mass index is 21.76 kg/m .    EXAM:  GENERAL: This is a well-developed 74 y.o. female who appears her stated age  HEAD: normocephalic  HEENT: Pupils equal and reactive bilaterally  CARDIAC: RRR without murmur  CHEST/LUNG:  Clear to auscultation  ABDOMEN: Soft, nontender, nondistended, no masses    NEUROLOGIC: Focally intact, nonfocal  VASCULAR: Pulses intact, symmetrical upper and lower extremities. Erythema of right ankle area and chronic changes,    VAS Wound 04/03/19 right leg (Active)   Pre Size Length 2 9/10/2019 10:00 AM   Pre Size Width 2 9/10/2019 10:00 AM   Pre Size Depth 0.1 9/10/2019 10:00 AM   Pre Total Sq cm 4 9/10/2019 10:00 AM   Prodcut Used ABD Pad;Bactroban 9/10/2019 10:00 AM       VASC Wound 08/21/19 right medial ankle (Active)   Description pink,less o/a's and weeping 9/10/2019 10:00 AM   Prodcut Used ABD Pad;Bactroban 9/10/2019 10:00 AM              LABS:  No results found for: WBC, HGB, HCT, MCV, PLT  INR/Prothrombin Time      No results found for: HGBA1C  Lab Results   Component Value Date    ALT 14 06/08/2018    AST 33 06/08/2018    ALKPHOS 82 06/08/2018    BILITOT 0.5 06/08/2018        Images:     Delvis Silveira MD on 8/23/2019 15:47   US Venous Post Ablation Leg Right (Order [id number])   Imaging   Date: 8/23/2019 Department: [facility] Released By: Brandi Santo Authorizing: Delvis Silveira MD   Study Result     [facility]  LOCATION: [facility]  DATE: 8/23/2019     EXAM: US VENOUS POST ABLATION LEG RIGHT      INDICATION: Symptomatic varicose veins status post endovenous ablation.     COMPARISON: 8/21/2019.     TECHNIQUE: Duplex imaging is performed utilizing gray-scale, two-dimensional images, and color-flow imaging. Doppler waveform analysis and spectral Doppler imaging is also performed.     FINDINGS:   The right  great saphenous vein is occluded from the proximal thigh through the knee.     US Venous Insufficiency Leg Right (Order [id number])   Imaging   Date: 4/3/2019 Department: [facility] Released By: Christos Farias, TUTU, RVT Authorizing: No Zuluaga NP   Study Result     [facility] OUTPATIENT     DATE: 4/3/2019     EXAM: RIGHT LOWER EXTREMITY DEEP AND SUPERFICIAL VENOUS DUPLEX ULTRASOUND WITH PHYSIOLOGIC TESTING     INDICATION: Symptomatic varicose veins. Assess for incompetent veins.     TECHNIQUE: Supine and upright ultrasound of the deep and superficial veins with Valsalva and compression augmentation maneuvers. Duplex imaging is performed utilizing gray-scale, two-dimensional images, color-flow imaging, Doppler waveform analysis, and   spectral Doppler imaging.      INCOMPETENCY CRITERIA: Deep vein reflux reported when greater than 1,000 ms flow reversal.  Superficial vein reflux reported when  greater than 500 ms flow reversal.  vein reflux reported as greater than 350 ms flow reversal.     DEEP VEIN FINDINGS:     RIGHT LEG: The common femoral, profunda femoral, femoral, popliteal, and visualized calf veins are patent and compressible.  No deep venous reflux is noted.     RIGHT SUPERFICIAL VEIN FINDINGS:  GREAT SAPHENOUS VEIN: Superficial venous reflux is noted within the greater saphenous vein from the saphenofemoral junction to the mid calf. The vein measures 9 mm at the saphenofemoral junction. The vein measures 7-8 mm throughout the remaining portions   of the greater saphenous vein.     SMALL SAPHENOUS VEIN: Competent from the saphenopopliteal junction to the mid calf.        Mildly prominent lymph nodes are noted within the groin measuring up to 2.3 x 0.9 cm with normal morphology and preservation of the fatty clayton.     IMPRESSION:   CONCLUSION:   1.  No deep venous thrombosis of the right lower extremity.  2.  RIGHT LEG: Superficial venous reflux, as described.            Assessment/Plan:   1. Chronic skin ulcer, limited to breakdown of skin (H)  Switch to lotion.   No open ulcers  Compression return for issues or questions    2. Symptomatic varicose veins of right lower extremity  Same     3. Venous insufficiency of right leg  same      No follow-ups on file.     Delvis Silveira MD  [facility] Department of Surgery

## 2021-06-05 NOTE — PROGRESS NOTES
4 month f/u right GSV RFA/ [facility] seeing for wounds/bactroban and compression sock. Currently wearing compression on RLE only. Denies pain at present. C/O redness and itching on R medial ankle. Has been applying Bactroban and Hydrocortisone to ankle.

## 2021-06-17 NOTE — PATIENT INSTRUCTIONS - HE
Patient Instructions by No Zuluaga NP at 4/24/2019  9:40 AM     Author: No Zuluaga NP Service: -- Author Type: Nurse Practitioner    Filed: 4/24/2019 10:07 AM Encounter Date: 4/24/2019 Status: Addendum    : No Zuluaga NP (Nurse Practitioner)    Related Notes: Original Note by No Zuluaga NP (Nurse Practitioner) filed at 4/24/2019 10:04 AM       venous insufficiency u/s showed that the valves in your right leg veins are not working correctly; we will schedule you to see Dr. Silveira in 3 months    Your culture grew out Yeast and MRSA; continue current topical regimen; refill sent to your pharmacy    Referral sent for [facility] for your right hip pain and limp    We will repeat the u/s on the right groin in 3 months to check the lymph nodes again    Wound Care Instructions    1-2 times per day Cleanse your right leg and leg  wound(s) with Normal Saline or Wound Cleanser    Pat Dry    Apply Lotion to the intact skin surrounding your wound and other dry skin locations. Some good lotions include: Remedy Skin Repair Cream or Cetaphil    Apply mixture of gentamycin and bactroban and clotrimazole ointments into/onto the wounds    Cover with xeroform; ABD    Secure with roll gauze as needed    Compression: 1-2 layers of tubular compression; elevate your legs throughout the day    It is not ok to get your wound wet in the bath or shower    SEEK MEDICAL CARE IF:    You have an increase in swelling, pain, or redness around the wound.    You have an increase in the amount of pus coming from the wound.    There is a bad smell coming from the wound.    The wound appears to be worsening/enlarging    You have a fever greater than 101.5 F      No Zuluaga DNP, RN, CNP, [facility]  [facility]  [phone]    It is recommended that you do not get your ulcer wet when showering.  Listed below are several ways of keeping it dry when you shower.     1. Wrap it with Press and Seal  plastic wrap.  It can be found in the stores where the plastic wraps or tin foil is kept.    2.  Some people take a bath and hang their leg/foot out of the tub.    3  Put your leg in a plastic bag and tape it on.         4. You can purchase a shower cover for casts at some pharmacies and through the Internet.    Venous Insufficiency Ultrasound      Today we ordered supplies for you from [employer]. To reorder supplies or if you have any questions about your order please call [employer] Customer Service at [phone]

## 2021-06-17 NOTE — PATIENT INSTRUCTIONS - HE
Patient Instructions by No Zuluaga NP at 4/3/2019  1:20 PM     Author: No Zuluaga NP Service: -- Author Type: Nurse Practitioner    Filed: 4/3/2019  2:10 PM Encounter Date: 4/3/2019 Status: Signed    : No Zuluaga NP (Nurse Practitioner)       We will obtain a venous insufficiency u/s today to check the valves in your veins and to check for DVT    We obtained a culture today this will take approximately 5 days to get results we will call you with these    I sent prescription for Keflex which is an oral antibiotic to your pharmacy take 500mg four times a day for 14 days    Patient can also  some probiotics such as Culturelle to help prevent Antibiotic associated diarrhea. They can take this 1 hour before or 2 hours after taking their antibiotic. Should not be taken at the same time as they can cancel out the effects.     I sent prescription for x2 topical antibiotics to your pharmacy gentamycin and bactroban      Wound Care Instructions    1-2 times per day Cleanse your right leg and leg  wound(s) with Normal Saline or Wound Cleanser    Pat Dry    Apply Lotion to the intact skin surrounding your wound and other dry skin locations. Some good lotions include: Remedy Skin Repair Cream or Cetaphil    Apply mixture of 50/50 gentamycin and bactroban ointments into/onto the wounds    Cover with xeroform; ABD    Secure with roll gauze as needed    Compression: 1-2 layers of tubular compression; elevate your legs throughout the day    It is not ok to get your wound wet in the bath or shower    SEEK MEDICAL CARE IF:    You have an increase in swelling, pain, or redness around the wound.    You have an increase in the amount of pus coming from the wound.    There is a bad smell coming from the wound.    The wound appears to be worsening/enlarging    You have a fever greater than 101.5 F      No Zuluaga DNP, RN, CNP, [facility]  [facility]  [phone]    It is recommended that  you do not get your ulcer wet when showering.  Listed below are several ways of keeping it dry when you shower.     1. Wrap it with Press and Seal plastic wrap.  It can be found in the stores where the plastic wraps or tin foil is kept.    2.  Some people take a bath and hang their leg/foot out of the tub.    3  Put your leg in a plastic bag and tape it on.         4. You can purchase a shower cover for casts at some pharmacies and through the Internet.    Venous Insufficiency Ultrasound    Description  A venous insufficiency ultrasound is a relatively pain free exam. The vascular laboratory will contain a bed and just two or three pieces of equipment. You will be asked to remove pants or shorts and gowns will be provided. It usually takes about 30-60 minutes.  The technician will tuck a towel under your underpants in the groin. The gel is water-soluble and will not stain your skin or clothes.  Ultrasound gel, usually warmed for your comfort, will be placed on the inner side of your legs.    Through the gel, the technician will apply to your legs a small hand-held device that emits sound waves. The technician will be applying pressure to your legs at certain levels.   When the test is completed, the technician will remove excess gel from your legs.    Risks  There are typically no side effects or complications associated with a lower extremity venous insufficiency duplex ultrasound.  How to Prepare  Eat and take medications as usual.  There is no preparation required for a lower extremity venous insufficiency duplex ultrasound.  What Can I Expect After the Test?  The technician will send the ultrasound images to your vascular surgeon for evaluation. Typically, a report is available in 2-3 days. If anything critical is found, it is standard practice to notify the vascular surgeon immediately.  Reference: https://vascular.org/patient-resources/vascular-conditions/chronic-venous-insufficiency

## 2021-06-17 NOTE — PATIENT INSTRUCTIONS - HE
"Patient Instructions by Estiven Fleming LPN at 1/22/2020  9:20 AM     Author: Estiven Fleming LPN Service: -- Author Type: Licensed Nurse    Filed: 1/22/2020  9:29 AM Encounter Date: 1/22/2020 Status: Signed    : Estiven Fleming LPN (Licensed Nurse)       We are prescribing some compression stockings for you. I have included different suppliers that should help you get measured and fitting to ensure proper fitting socks. You should wear this socks as much as you can. It is especially important to wear them with long periods of sitting/standing, long car rides or if you will be flying. Compression socks should get refilled every 4-6 months. They do not need to be worn at night while in bed.    If you do a lot of standing it is good to do calf raises to help keep the blood pumping. If you sit a lot at work it is good to get up periodically to walk around. Elevation of the foot of your bed 4-6\" helps the blood return back to where it is needed.    Varicose Veins      Varicose veins are swollen, enlarged veins most often found in the legs. They are usually blue or purple in color and may bulge, twist, and stand out under the skin.  Normally, veins return blood from the body to the heart. The leg veins have one-way valves that prevent blood from flowing backward in the vein. When the valves are weak or damaged, blood backs up in the veins. This may cause some of the veins to swell and bulge and become varicose veins.  Symptoms  Varicose veins may or may not cause symptoms. If symptoms do occur, they can include:    Legs that feel tired, achy, heavy, or itchy    Leg muscle cramps    Skin changes, such as discoloration, dryness, redness, or rash (in more severe cases, you may also have sores on the skin called venous leg ulcers)  Risk Factors  There are a number of factors that increase the risk for varicose veins. These can include:    Being a woman    Being older    Sitting or standing for long " periods    Being overweight    Being pregnant    Having a family history of varicose veins  Treatment begins with simple self-help measures (see below). If these dont help, there are many procedures that can be done to shrink or remove varicose veins. Your healthcare provider can tell you more about these options, if needed.  Home care    Support or compression stockings will likely be prescribed. If so, be sure to wear them as directed. They may help improve blood flow.    Exercising helps strengthen your leg muscles and improve blood flow. To get the most benefit, choose exercises such as walking, swimming, or cycling. Also try to exercise for at least 30 minutes on most days.    Raising (elevating) your legs lets gravity help blood flow back to the heart. Sit or lie with your feet above heart level a few times throughout the day, or as directed.    Avoid long periods of sitting or standing. Change positions often. Also, move your ankles, toes and knees often. This may also help improve blood flow.    If you are overweight, talk with your healthcare provider about setting up a weight-loss plan. Maintaining a healthy weight can help reduce the strain on your veins. It may also improve symptoms, such as swelling and aching.    If you have dryness and itching, ask your provider about special lotions that can be applied to the skin to help improve symptoms.  Follow-up care  Follow up with your healthcare provider, or as directed. If imaging tests were done, youll be told the results and if there are any new findings that affect your care.  When to seek medical advice  Call your healthcare provider right away if any of these occur:    Sudden, severe leg swelling, pain, or redness    Symptoms worsen, or they dont improve with self-care    Bleeding from any affected veins    Ulcers form on the legs, ankles, or feet    Fever of 100.4 F (38 C) or higher, or as advised by your provider      Understanding Spider and Varicose  Veins  Do you often hide your legs because of the way they look? You may have noticed tiny red or blue bursts (spider veins). Or maybe you have veins that bulge or look twisted (varicose veins). If so, there are treatments that can help  What are the symptoms?  Spider veins or varicose veins may never be a problem. But sometimes they can cause legs to ache or swell. Your legs may also feel heavy and tired, or like theyre burning. These symptoms may be more severe at the end of the day. Prolonged sitting or standing can also make your symptoms worse.  Who gets spider and varicose veins?  Anyone can get spider or varicose veins. But vein problems tend to be hereditary (run in families). Other factors that can affect veins include:    Pregnancy, hormones, and birth control pills    A job where you stand or sit a lot    Extra weight or lack of exercise    Age         Spider veins look like tiny webs on the ankles, legs, and upper thighs.       Ropy, dark blue, red, or flesh-colored varicose veins are most common on the thighs, calves, and feet.    What can be done?  Spider and varicose veins can affect the way you feel about yourself. Talk to your healthcare provider about your concerns. There are treatments that can ease symptoms and make your legs look better.  Your treatment choices  Treatment may include self-care, sclerotherapy (injecting veins with a chemical), surgery, or newer nonsurgical minimally invasive therapies. Spider veins and some varicose veins can be treated with sclerotherapy. Large varicose veins can often be treated with newer minimally invasive procedures and, in rare cases, surgery may be needed.     Continue wound cares as prescribed by [facility]  [address]  [place] [address]    [place] [phone]  [phone]  [place] [phone]  [email or web address]      [facility]   [address]             [address]                 [address]     [place] [address]      [place] [address]         [place], [place] [id number] [phone]  [phone]     [phone]               Fax: [phone]  [email or web address]  [address] [facility] [place] [address] [phone]  [phone]  [email or web address]    [facility]  [phone]  [email or web address]    [facility]   [phone]    [facility]  [address]  [place], [place] [address] [phone]

## 2021-06-19 NOTE — LETTER
Letter by No Zuluaga NP at      Author: No Zuluaga NP Service: -- Author Type: --    Filed:  Encounter Date: 4/3/2019 Status: (Other)       4/3/2019    [facility]  Fax: [phone] Wound Dressing Rx and Order Form  Customer Service: [phone] Order Status: New Order   Verbal: Dionne        Patient Info:  Name: Jen Willingham  : 1945  Address:   [address] [place] [address] [address] [place] [address]  Phone: [phone]    Insurance Info:  Primary: Payor: MEDICARE / Plan: MEDICARE A AND B / Product Type: Medicare /    Secondary: N/A N/A  [id number] - ([facility])    Physician Info:   Name:  No Zuluaga NP   Dept Address/Phones:   [address] [place] [address], [address]  [facility] [address] [phone]  Fax: [phone]    Lymphedema circumferential measurements (in cm):  Right just above MTP: 18.7    Right Ankle: 20.5    Right Widest Calf: 35    No Data Recorded  Left - just above MTP: 19    Left Ankle: 17.5    Left Widest Calf: 29.3    No Data Recorded      Wound info:  Encounter Diagnoses   Name Primary?   ? Chronic venous hypertension, right Yes   ? Venous insufficiency of right leg    ? Varicose veins of right lower extremity with ulcer of calf limited to breakdown of skin (H)    ? Venous stasis ulcer of other part of right lower leg limited to breakdown of skin with varicose veins (H)    ? Cellulitis of right leg      VASC Wound 19 right leg (Active)   Pre Size Length 12 4/3/2019  1:00 PM   Pre Size Width 10 4/3/2019  1:00 PM   Pre Size Depth 0.1 4/3/2019  1:00 PM   Pre Total Sq cm 120 4/3/2019  1:00 PM     Drainage: Moderate  Thickness:  Full  Duration of Need: 30  Days Supply: 30  Start Date: 4/3/19  Starter Kit: Ancillary Kit (saline, gloves)  Qualifying wound/Debridement Yes      Dressing Type Brand Size Number of pieces Frequency of change   Primary Xeroform  5''x9'' 30 Daily   Secondary ABD Pads   5''x9'' 30 Daily    Square gauze   4''x4'' 2  loafs Daily    Sof form roll gauze  4''x75'' max Daily    Saline   5ml 1 box Daily    Dermafit   F 1 box Daily   Tape Paper  1'' 2 box Daily     Note: If total out of pocket is more than $50.00 please contact the patient before processing order.     OK to forward to covered supplier.     Electronically Signed Physician: No Zuluaga NP Date: 4/3/2019

## 2021-06-19 NOTE — LETTER
Letter by No Zuluaga NP at      Author: No Zuluaga NP Service: -- Author Type: --    Filed:  Encounter Date: 10/1/2019 Status: Signed       10/1/2019    [facility]  Fax: [phone] Wound Dressing Rx and Order Form  Customer Service: [phone] Order Status: New Order   Verbal: Dionne             Patient Info:  Name: Jen Willingham  : 1945  Address:   [address] [place] [address] [address] [address]  Phone: [phone]      Insurance Info:  Primary: Payor: UCARE / Plan: ARE MEDICARE / Product Type: MEDICARE ADVANTAGE /    Secondary: N/A N/A  [id number] - ([facility])      Physician Info:   Name:  No Zuluaga NP   Dept Address/Phones:   [address] [place] [address], [address]  [facility] [address] [phone]  Fax: [phone]    Lymphedema circumferential measurements (in cm):  Right just above MTP: 18.5    Right Ankle: 21.8    Right Widest Calf: 28.8    No data recorded  No data recorded  No data recorded  No data recorded  No data recorded    Wound info:  Encounter Diagnoses   Name Primary?   ? Chronic skin ulcer, limited to breakdown of skin (H) Yes   ? Venous insufficiency of right leg    ? Varicose veins of right lower extremity with ulcer of calf limited to breakdown of skin (H)    ? Symptomatic varicose veins of right lower extremity    ? Chronic venous hypertension, right    ? MRSA (methicillin resistant staph aureus) culture positive      VASC Wound 19 right leg (Active)   Pre Size Length 2 9/10/2019 10:00 AM   Pre Size Width 2 9/10/2019 10:00 AM   Pre Size Depth 0.1 9/10/2019 10:00 AM   Pre Total Sq cm 4 9/10/2019 10:00 AM   Prodcut Used ABD Pad;Bactroban 9/10/2019 10:00 AM       VASC Wound 19 right medial ankle (Active)   Description pink,less o/a's and weeping 9/10/2019 10:00 AM   Prodcut Used ABD Pad;Bactroban 9/10/2019 10:00 AM     Drainage: Moderate  Thickness:  Partial   Duration of Need: 30  Days Supply: 30  Start Date:  10/1/19  Starter Kit: Ancillary Kit (saline, gloves, gauze)  Qualifying wound/Debridement Yes      Dressing Type Brand Size Number of pieces Frequency of change   Primary Viscopaste   4'' 2 rolls  Daily   Secondary  ABD Pads  5''x9'' 30  Daily    sof form roll gauze   4''x75'' 30 Daily     Square gauze   4''x4'' 1 loaf  Daily    Saline bullets   5 ml individual use  1 box  Daily   TAPE  Paper  1'' 2 rolls  Daily     Note: If total out of pocket is more than $50.00 please contact the patient before processing order.     OK to forward to covered supplier.     Electronically Signed Physician: No Zuluaga NP Date: 10/1/2019

## 2021-06-19 NOTE — LETTER
Letter by No Zuluaga NP at      Author: No Zuluaga NP Service: -- Author Type: --    Filed:  Encounter Date: 2019 Status: (Other)       2019    [facility]  Fax: [phone] Wound Dressing Rx and Order Form  Customer Service: [phone] Order Status: New Order   Verbal: Dionne         Patient Info:  Name: Jen Willingham  : 1945  Address:   [address] [address]  Phone: [phone]    Insurance Info:  Primary: Payor: UCARE / Plan: UCARE MEDICARE / Product Type: MEDICARE ADVANTAGE /    Secondary: N/A N/A  [id number] - (Medicare)    Physician Info:   Name:  No Zuluaga NP   Dept Address/Phones:   [address] [place] [address], [facility] [address] [phone]  Fax: [phone]    Lymphedema circumferential measurements (in cm):  Right just above MTP: 18.7    Right Ankle: 20.5    Right Widest Calf: 35    No data recorded  Left - just above MTP: 19    Left Ankle: 17.5    Left Widest Calf: 29.3    No data recorded    Wound info:  Encounter Diagnoses   Name Primary?   ? Chronic venous hypertension, right    ? Venous insufficiency of right leg    ? Varicose veins of right lower extremity with ulcer of calf limited to breakdown of skin (H)    ? Venous stasis ulcer of other part of right lower leg limited to breakdown of skin with varicose veins (H)    ? Cellulitis of right leg    ? Right hip pain Yes   ? Lymphadenopathy, inguinal      VASC Wound 19 right leg (Active)   Pre Size Length 0 2019  9:00 AM   Pre Size Width 0 2019  9:00 AM   Pre Size Depth 0 2019  9:00 AM   Pre Total Sq cm 0 2019  9:00 AM   Reddened area may need to call and offering pricing     Drainage: None  Thickness:  none  Duration of Need: 30  Days Supply: 30  Start Date: 19  Starter Kit: Ancillary Kit (saline, gloves, gauze)  Qualifying wound/Debridement No      Dressing Type Brand Size Number of pieces Frequency  of change   Primary Xeroform   5''x9'' 30 Daily   Secondary ABD Pad  5''x9'' 30 Daily    Sof form roll gauze   4''x75'' 30 Daily    Saline bullets   5ml 1 box  Daily    Square gauze   4''x4'' 1 loaf Daily   Tape Paper  1'' 2 rolls  Daily     Note: If total out of pocket is more than $50.00 please contact the patient before processing order.     Please call to offer pricing if needed     OK to forward to covered supplier.     Electronically Signed Physician: No Zuluaga NP Date: 4/24/2019

## 2021-06-27 NOTE — PROGRESS NOTES
Progress Notes by No Zuluaga NP at 4/24/2019  9:40 AM     Author: oN Zuluaga NP Service: -- Author Type: Nurse Practitioner    Filed: 4/24/2019 10:53 AM Encounter Date: 4/24/2019 Status: Signed    : No Zuluaga NP (Nurse Practitioner)       Follow up Vascular Visit       Date of Service:4/24/2019    Date Last Seen: 4/8/2019; 4/11/2019    Chief Complaint: right leg weeping; edema; rash    History:   Past Medical History:   Diagnosis Date   ? Abnormal thyroid function test 11/19/2012   ? Allergic rhinitis due to other allergen 3/27/2019   ? Asymptomatic postmenopausal status 3/27/2019    Overview:         stopped HRT in 2002, menopause 1996 - age 51 Problem list name updated by automated process. Provider to review   ? Essential hypertension 3/27/2019    Overview:  Normal stress ECHO 12/2001 Labile - good control generally, but times when increases for a few days to 160-200/'s - see ED visit Problem list name updated by automated process. Provider to review   ? Hypothyroidism 11/23/2012   ? Other osteoporosis 3/27/2019   ? Sjoegren syndrome (H)    ? 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 She arrives alone. She is doing well. At her initial visit we obtained an u/s and this demonstrated venous incompetence in the RLE along with lymphadenopathy in the right inguinal region; results were previously d/w her and I went over this again today. We will have her see Dr. Silveira in 3 months and also repeat the u/s to the groin in 3 months to ensure the lymph nodes are not enlarging; this most likely is reactive. We obtained a culture this grew out yeast and mrsa; she was switched from keflex to bactrim tolerated well; we have her applying gent/bact/clotrimazole to  "the right leg and this has improved significantly; no longer weeping; much less itching. She is using tubular compression to the leg and elevation this is going well. No fevers, chills. She is c/o continued right hip pain which is getting worse over the years; has history of severe right leg injury 50 years ago.     Allergies: Hydroxychloroquine and Latanoprost    Physical Exam:    /86   Pulse 68   Temp 97.9  F (36.6  C) (Oral)   Resp 16   Ht 5' 1.75\" (1.568 m) Comment: per pt report  Wt 117 lb (53.1 kg) Comment: per pt report  BMI 21.57 kg/m      General:  Patient presents to clinic in no apparent distress.  Head: normocephalic atraumatic  Psychiatric:  Alert and oriented x3.   Respiratory: unlabored breathing; no cough  Integumentary:  Skin is uniformly warm, dry and pink.    Extremities: right leg with +1 pitting edema; there is a 89v88aw area of irregular; diffuse erythema; no longer weeping; much less red and inflamed appearing; no warmth to the tissues; no odor; Nails well trimmed; webbing clear     Sensation: Intact to pinprick and light touch throughout lower extremities bilaterally      Peripheral Vascular:  Good capillary refill. No unusual venous distention. Positive for spider veins, telangiectasias, hemosiderin deposition or hyperpigmentation and fibrosis or scarring        Circumferential volume measures:    Vasc Edema 4/3/2019   Right just above MTP 18.7   Right Ankle 20.5   Right Widest Calf 35   Left - just above MTP 19   Left Ankle 17.5   Left Widest Calf 29.3       Ulceration(s)/Wound(s):     VASC Wound 04/03/19 right leg (Active)   Pre Size Length 0 4/24/2019  9:00 AM   Pre Size Width 0 4/24/2019  9:00 AM   Pre Size Depth 0 4/24/2019  9:00 AM   Pre Total Sq cm 0 4/24/2019  9:00 AM        Lab Values    No results found for: SEDRATE  Lab Results   Component Value Date    CREATININE 0.74 10/10/2018     No results found for: HGBA1C  Lab Results   Component Value Date    BUN 15 10/10/2018 "     Lab Results   Component Value Date    ALBUMIN 4.5 06/08/2018     Vitamin D, Total (25-Hydroxy)   Date Value Ref Range Status   11/21/2016 52.6 30.0 - 80.0 ng/mL Final             Impression:  1. Right hip pain  Ambulatory referral to Orthopedic Surgery   2. Chronic venous hypertension, right  mupirocin (BACTROBAN) 2 % ointment   3. Venous insufficiency of right leg  mupirocin (BACTROBAN) 2 % ointment   4. Varicose veins of right lower extremity with ulcer of calf limited to breakdown of skin (H)  mupirocin (BACTROBAN) 2 % ointment   5. Venous stasis ulcer of other part of right lower leg limited to breakdown of skin with varicose veins (H)  mupirocin (BACTROBAN) 2 % ointment   6. Cellulitis of right leg  mupirocin (BACTROBAN) 2 % ointment   7. Lymphadenopathy, inguinal  US Groin Non Vascular Right     4/3/19 right leg             Are any of these wounds new today: No; Location: na    Assessment/Plan:          1. Debridement: na           2. Edema: Venous insufficiency was +; will refer to Dr. Silveira in 3 months; we will continue the tubular compression until the leg rash is resolved; continue elevation; will eventually need a comrpession stocking. The compression wraps were applied today in clinic.           3.  Wound treatment: wound treatment will include irrigation and dressings to promote autolytic debridement which will include:leg is significantly improved but not completely healed; will continue to wash the area with saline; apply  Mixture of gent/bact/clotrimazole; xeroform; abd; rolled gauze; will order supplies           4. Nutrition: focus on protein           5. Offloading: na           6. Right hip pain: history of significant trauma to the right leg and hip; continues to worsen; will refer to [facility] for further evaluation          7. Lymphadenopathy: will repeat u/s in 3 months to check on the enlarged lymph nodes in the right groin; most likely this is just reactive     Patient will  follow up with me in 3-4 weeks for reevaluation. They were instructed to call the clinic sooner with any signs or symptoms of infection or any further questions/concerns. Answered all questions.    No Zuluaga DNP, RN, CNP, [facility]  [phone]        This note was electronically signed by No Zuluaga

## 2021-06-27 NOTE — PROGRESS NOTES
Progress Notes by Candice Vasquez LPN at 8/28/2019  9:20 AM     Author: Candice Vasquez LPN Service: -- Author Type: Licensed Nurse    Filed: 8/28/2019 10:29 AM Encounter Date: 8/28/2019 Status: Signed    : Candice Vasquez LPN (Licensed Nurse)           Compression Applied to Right  2-Layer Coban: I Applied the inner foam layer with the foot dorsiflexed and started atthe base of the fifth metatarsal head. I left the bottom of the heel exposed, and proceed by winding the foam up the leg using minimal overlap to just below the fibular head. I then applied the compression layer with the foot dorsiflexed and startingat the base of the fifth metatarsal head. I applied at full stretch and proceeded up the leg using 50% overlap. The bottom of the heel is covered with the compression layer up to the end at the fibular head just below the back of the knee and levelwith the top edge of the foam layer.  I gently pressed and conformed the entire surface of the system to ensurethat the two layers are firmly bound together               Nurse Visit    Chief Complaint: Patient presents to clinic for assement, and treatment of their ulcer and and swelling    Dressing on Arrival telfa non-adherent dressing, roll gauze, tubular compression.    Allergies:   Allergies   Allergen Reactions   ? Hydroxychloroquine Hives   ? Latanoprost Hives       Medications:   Current Outpatient Medications:   ?  amLODIPine (NORVASC) 2.5 MG tablet, Take 2.5 mg by mouth., Disp: , Rfl:   ?  aspirin 81 MG EC tablet, Take 81 mg by mouth., Disp: , Rfl:   ?  biotin 5,000 mcg TbDL, Take by mouth., Disp: , Rfl:   ?  calcium-vitamin D 500 mg(1,250mg) -200 unit per tablet, Take by mouth., Disp: , Rfl:   ?  clotrimazole (LOTRIMIN) 1 % cream, Apply to irritated areas of skin twice a day until healed.  Do not apply to open ulcers., Disp: 45 g, Rfl: 0  ?  coenzyme Q10 100 mg capsule, Take 100 mg by mouth., Disp: , Rfl:   ?   levothyroxine (SYNTHROID, LEVOTHROID) 75 MCG tablet, Take 75 mcg by mouth., Disp: , Rfl:   ?  losartan (COZAAR) 100 MG tablet, 1 tab(s) once a day orally, Disp: , Rfl: 3  ?  multivitamin therapeutic tablet, Take by mouth., Disp: , Rfl:   ?  omega-3 acid ethyl esters (LOVAZA) 1 gram capsule, Take 4 g by mouth., Disp: , Rfl:   ?  timolol maleate (TIMOPTIC) 0.5 % ophthalmic solution, INSTILL ONE DROP IN EACH EYE IN THE MORNING, Disp: , Rfl: 6  ?  turmeric root extract 500 mg cap, Take by mouth., Disp: , Rfl:     Vital Signs: /78   Pulse 68   Temp 98.1  F (36.7  C)   Resp 18       Assessment:    General:  Patient presents to clinic in no apparent distress.  Psychiatric:  Alert and oriented x3.   Lower extremity:  edema is not present.    Integumentary:  Skin is uniformly warm, dry and pink.    Wound size:   VAS Wound 19 right leg (Active)   Pre Size Length 0 2019  9:00 AM   Pre Size Width 0 2019  9:00 AM   Pre Size Depth 0 2019  9:00 AM   Pre Total Sq cm 0 2019  9:00 AM   Description weeping 2019  9:00 AM       VAS Wound 19 right medial ankle (Active)      Undermining is not present.    The periwoundskin is erythema      Plan:         1. Patient will f/u in 1 week         2. Treatment provided will include irrigation and dressings to promote autolytic debridement and will be as listed below     Cleansed with: Normal Saline    Protected skin with: 3-M Cavilon    Dressings Applied: non-adhesive foam.    Compression Applied to the Left Leg: None    Compression Applied to the Right Le-Layer Coban    Offloading applied: None    Trial Products: no  Provider notified regarding concerns: no  Treatment Changes: no    Educational Barriers: none  Taught Regarding: Activity, Compliance, Compression and Dressing  Teaching Method: Explanation and DC sheet

## 2021-06-27 NOTE — PROGRESS NOTES
Progress Notes by No Zuluaga NP at 4/3/2019  1:20 PM     Author: No Zuluaga NP Service: -- Author Type: Nurse Practitioner    Filed: 4/3/2019  3:36 PM Encounter Date: 4/3/2019 Status: Signed    : No Zuluaga NP (Nurse Practitioner)       [facility] Consult Note    Date of Service:  4/3/2019    Requesting Provider: Dr. Ashley Chavez    Chief Complaint: right leg swelling; right leg cellulitis; right leg ulcer    History of Present Illness: Jen Willingham is being seen at the [facility] today regarding right leg swelling, cellulitis and ulcerations. They arrive to the clinic today alone. 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 Was started on plaquinil she was allergic this was discontinued; no further oral agents tried. She developed spontaneous blistering on the right leg about 3 weeks ago; this continued to progress; denies trauma to the area; she tried applying the tcm cream and this made things worse; she was treated with a course of keflex; she felt this helped things; has been off of this one week. Was previously using tcm, telfa; rolled gauze; changing tid. Reports pain of on/off pain 5/10 where the varicose vein is located; currently using apap and aleve for pain. Has used nothing as compression in the past. Denies any fevers, chills, or generalized ill feeling. Denies history of cancer. Sleeps in a bed with legs elevated. Never smoker, no pets in the home, retired; previously worked in [employer].  Pt has been under a lot of stress as of recent her  has early stages dementia and advanced MS; her son has just recently attempted suicide and is now living with her. Pt still has their uterus and ovaries, they deny any abnormal vaginal bleeding. Denies  history of DVT, joint replacement and vein procedures. Positive history of cellulitis. Reports no u/s; no cultures recently.    Review of Systems:   Constitutional:  negative  for fever, chills or night sweats  EENTM: positive for glasses;  negative [place]  GI:  negative for nausea/vomiting;  negative for constipation  negative diarrhea;  negative for fecal incontinence negative weight loss  :  negative dysuria, negative incontinence  MS: patient is ambulatory;  does not use assistive devices  Cardiac:  negative   Respiratory:  negative SOB  Endocrine:  negative diabetes  Psych:  negative depression/anxiety    Past Medical History:    Past Medical History:   Diagnosis Date   ? Abnormal thyroid function test 11/19/2012   ? Allergic rhinitis due to other allergen 3/27/2019   ? Asymptomatic postmenopausal status 3/27/2019    Overview:         stopped HRT in 2002, menopause 1996 - age 51 Problem list name updated by automated process. Provider to review   ? Essential hypertension 3/27/2019    Overview:  Normal stress ECHO 12/2001 Labile - good control generally, but times when increases for a few days to 160-200/'s - see ED visit Problem list name updated by automated process. Provider to review   ? Hypothyroidism 11/23/2012   ? Other osteoporosis 3/27/2019   ? 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 APPENDECTOMY  2001        Medications:    Current Outpatient Medications:   ?  amLODIPine (NORVASC) 2.5 MG tablet, Take 2.5 mg by mouth., Disp: , Rfl:   ?  coenzyme Q10 100 mg capsule, Take 100 mg by mouth., Disp: , Rfl:   ?  levothyroxine (SYNTHROID, LEVOTHROID) 75 MCG tablet, Take 75 mcg by mouth., Disp: , Rfl:   ?  multivitamin therapeutic tablet, Take by mouth., Disp: , Rfl:   ?  aspirin 81 MG EC tablet, Take 81 mg by mouth., Disp: , Rfl:   ?  biotin  5,000 mcg TbDL, Take by mouth., Disp: , Rfl:   ?  calcium-vitamin D 500 mg(1,250mg) -200 unit per tablet, Take by mouth., Disp: , Rfl:   ?  losartan (COZAAR) 100 MG tablet, 1 tab(s) once a day orally, Disp: , Rfl: 3  ?  omega-3 acid ethyl esters (LOVAZA) 1 gram capsule, Take 4 g by mouth., Disp: , Rfl:   ?  timolol maleate (TIMOPTIC) 0.5 % ophthalmic solution, INSTILL ONE DROP IN EACH EYE IN THE MORNING, Disp: , Rfl: 6  ?  turmeric root extract 500 mg cap, Take by mouth., Disp: , Rfl:     Allergies:   Allergies   Allergen Reactions   ? Hydroxychloroquine Hives   ? Latanoprost Hives       Family history:   Family History   Problem Relation Age of Onset   ? Arthritis Mother    ? Osteoarthritis Mother    ? Heart disease Father    ? Coronary artery disease Sister         Social History:   Social History     Socioeconomic History   ? Marital status:      Spouse name: Not on file   ? Number of children: Not on file   ? Years of education: Not on file   ? Highest education level: Not on file   Occupational History   ? Not on file   Social Needs   ? Financial resource strain: Not on file   ? Food insecurity:     Worry: Not on file     Inability: Not on file   ? Transportation needs:     Medical: Not on file     Non-medical: Not on file   Tobacco Use   ? Smoking status: Never Smoker   ? Smokeless tobacco: Never Used   Substance and Sexual Activity   ? Alcohol use: Yes     Frequency: 4 or more times a week     Drinks per session: 1 or 2     Binge frequency: Never   ? Drug use: No   ? Sexual activity: Not on file   Lifestyle   ? Physical activity:     Days per week: Not on file     Minutes per session: Not on file   ? Stress: Not on file   Relationships   ? Social connections:     Talks on phone: Not on file     Gets together: Not on file     Attends Confucianism service: Not on file     Active member of club or organization: Not on file     Attends meetings of clubs or organizations: Not on file     Relationship status:  "Not on file   ? Intimate partner violence:     Fear of current or ex partner: Not on file     Emotionally abused: Not on file     Physically abused: Not on file     Forced sexual activity: Not on file   Other Topics Concern   ? Not on file   Social History Narrative   ? Not on file        Physical Exam  Vitals: Blood pressure 124/86, pulse 80, temperature 98.9  F (37.2  C), temperature source Oral, resp. rate 16, height 5' 1.75\" (1.568 m), weight 119 lb (54 kg).  General: This is a very thin 73 y.o. female who appears their reported age, not in acute distress  Head: normocephalic, Atraumatic; wearing glasses; non-icteric sclera; no exudate; mild hearing loss   Respiratory: Clear throughout all lung fields; unlabored breathing; no cough   Cardiac: Regular, Rate and Rhythm, no murmurs appreciated   Skin: Uniformly warm and dry  Psych: Alert and oriented x4; calm and cooperative throughout exam  Abdomen: Normal bowel sounds. Soft, symmetric, no guarding or rigidity, nontender with palpation.  No organomegaly or masses palpated.   Lymphatics: No palpable nodes to bilateral groin   Extremities: right leg with +2 pitting edema; there is a [id number] area of irregular; macerated; sloughing skin; this was debrided; there was diffuse erythema; warmth to the tissues; no odor; copious serous drainage noted on old dressing; strength testing revealed 4/4 to BLEs. Nails well trimmed; webbing clear    Sensation: Intact to pinprick and light touch throughout lower extremities bilaterally     Peripheral Vascular: noraml dorsalis pedis, posterior tibial pulses to bilateral feet , using a handheld doppler these were strong; triphasic in nature.  Good capillary refill. No unusual venous distention. Positive for spider veins, telangiectasias, hemosiderin deposition or hyperpigmentation and fibrosis or scarring      Circumferential volume measures:    Vasc Edema 4/3/2019   Right just above MTP 18.7   Right Ankle 20.5   Right Widest Calf 35 "   Left - just above MTP 19   Left Ankle 17.5   Left Widest Calf 29.3       Ulceration(s)/Wound(s):     VASC Wound 04/03/19 right leg (Active)   Pre Size Length 12 4/3/2019  1:00 PM   Pre Size Width 10 4/3/2019  1:00 PM   Pre Size Depth 0.1 4/3/2019  1:00 PM   Pre Total Sq cm 120 4/3/2019  1:00 PM       Laboratory studies:   No results found for: SEDRATE  Lab Results   Component Value Date    CREATININE 0.74 10/10/2018     No results found for: HGBA1C  Lab Results   Component Value Date    BUN 15 10/10/2018     Lab Results   Component Value Date    ALBUMIN 4.5 06/08/2018     Vitamin D, Total (25-Hydroxy)   Date Value Ref Range Status   11/21/2016 52.6 30.0 - 80.0 ng/mL Final     4/3/19 right leg            Impression:   Right leg cellulitis  Right leg edema  Varicose veins  Venous hypertension   Venous insufficiency      Assessment/Plan:  1. Excisional debridement of all the ulcer(s) was recommended today. After consent was obtained and 2% Lidocaine HCL jelly was applied all of the ulcers were excisionally debrided using a sterile curet under clean condition the epidermal and dermal  were sharply debrided for a total square cm of 20. Devitalized and non viable tissue was removed to improve granulation tissue formation, stimulate wound healing, decrease overall bacteria load, disrupt biofilm formation and decrease edge senescence. Patient tolerated this well and the ulcers appeared much  afterwards.    2. Edema. We will treat with 1-2 layers of tubular compression along with elevation    3. Treatment: wound treatment will include irrigation and dressings to promote autolytic debridement which will include: appears to have unresolved cellulitis; with the erythema and copious drainge; culture was taken today; due to the appearance of the limb will start empirically on keflex; she has tolerated this in the past; will call her with culture results and adjust medications as indicated; will also treat topcially with  gent/bact; then xeroform; abd; rolled gauze; keep dry in the shower; she has cast covers already; we will obtain venous insufficiency u/s today to rule out dvt and check the valves in the veins ADDENDUM: u/s showed no dvt; +venous insufficieny; would be a candidate for rfa; also showed enlarged lymph nodes; most likely reactive from the infection; may need to recheck this in 3 months; results were d/w patient and answered all questions    4. Offloading: na    5. Nutrition: focus on protein; weight stable; taking vitamin d supplement; last albumin done 9 months ago and was high normal    Patient to return to clinic in 3 week(s) for re-evaluation. They were instructed to call the clinic sooner with any further questions or concerns. Answered all questions.    No Zuluaga DNP, RN, CNP, CWOCN  [facility]  [phone]      This note was electronically signed by No Zuluaga

## 2021-06-27 NOTE — PROGRESS NOTES
Progress Notes by No Zuluaga NP at 5/29/2019  8:20 AM     Author: No Zuluaga NP Service: -- Author Type: Nurse Practitioner    Filed: 5/29/2019 10:18 AM Encounter Date: 5/29/2019 Status: Signed    : No Zuluaga NP (Nurse Practitioner)       Follow up Vascular Visit       Date of Service:5/29/2019    Date Last Seen: 4/8/2019; 4/11/2019    Chief Complaint: right leg weeping; edema; rash    History:   Past Medical History:   Diagnosis Date   ? Abnormal thyroid function test 11/19/2012   ? Allergic rhinitis due to other allergen 3/27/2019   ? Asymptomatic postmenopausal status 3/27/2019    Overview:         stopped HRT in 2002, menopause 1996 - age 51 Problem list name updated by automated process. Provider to review   ? Essential hypertension 3/27/2019    Overview:  Normal stress ECHO 12/2001 Labile - good control generally, but times when increases for a few days to 160-200/'s - see ED visit Problem list name updated by automated process. Provider to review   ? Hypothyroidism 11/23/2012   ? Other osteoporosis 3/27/2019   ? Sjoegren syndrome (H)    ? 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 She arrives alone. She is doing well. At her initial visit we obtained an u/s and this demonstrated venous incompetence in the RLE along with lymphadenopathy in the right inguinal region; results were previously d/w her and I went over this again today. We will have her see Dr. Silveira in 3 months and also repeat the u/s to the groin in 3 months to ensure the lymph nodes are not enlarging; this most likely is reactive. We obtained a culture this grew out yeast and mrsa; she was switched from keflex to bactrim tolerated well; we have her applying gent/bact/clotrimazole to  "the right leg and this has improved significantly; no longer weeping; much less itching. She is using tubular compression to the leg and elevation this is going well. No fevers, chills. She is c/o continued right hip pain which is getting worse over the years; has history of severe right leg injury 50 years ago; we referred her to [place] [facility]. Reviewed with patient no changes since last visit dated 4/24/19.     Allergies: Hydroxychloroquine and Latanoprost    Physical Exam:    BP (!) 140/94   Pulse 68   Temp 98.1  F (36.7  C)   Resp 16   Ht 5' 1.75\" (1.568 m)   Wt 117 lb 3.2 oz (53.2 kg)   BMI 21.61 kg/m      General:  Patient presents to clinic in no apparent distress.  Head: normocephalic atraumatic  Psychiatric:  Alert and oriented x3.   Respiratory: unlabored breathing; no cough  Integumentary:  Skin is uniformly warm, dry and pink.    Extremities: right leg with trace edema; there is a 38b88ds area of irregular; diffuse but fading erythema; no longer weeping; much less red and inflamed appearing; no warmth to the tissues; no odor; Nails well trimmed; webbing clear; scaling noted over the shin; this was debrided; skin intact underneath     Sensation: Intact to pinprick and light touch throughout lower extremities bilaterally      Peripheral Vascular:  Good capillary refill. No unusual venous distention. Positive for spider veins, telangiectasias, hemosiderin deposition or hyperpigmentation and fibrosis or scarring        Circumferential volume measures:    Vasc Edema 4/3/2019   Right just above MTP 18.7   Right Ankle 20.5   Right Widest Calf 35   Left - just above MTP 19   Left Ankle 17.5   Left Widest Calf 29.3       Ulceration(s)/Wound(s):     VASC Wound 04/03/19 right leg (Active)   Pre Size Length 0 4/24/2019  9:00 AM   Pre Size Width 0 4/24/2019  9:00 AM   Pre Size Depth 0 4/24/2019  9:00 AM   Pre Total Sq cm 0 4/24/2019  9:00 AM        Lab Values    No results found for: SEDRATE  Lab Results "   Component Value Date    CREATININE 0.74 10/10/2018     No results found for: HGBA1C  Lab Results   Component Value Date    BUN 15 10/10/2018     Lab Results   Component Value Date    ALBUMIN 4.5 06/08/2018     Vitamin D, Total (25-Hydroxy)   Date Value Ref Range Status   11/21/2016 52.6 30.0 - 80.0 ng/mL Final             Impression:  1. Chronic venous hypertension, right     2. Venous insufficiency of right leg     3. Varicose veins of right lower extremity with ulcer of calf limited to breakdown of skin (H)     4. Venous stasis ulcer of other part of right lower leg limited to breakdown of skin with varicose veins (H)     5. Cellulitis of right leg     6. Right hip pain     7. Lymphadenopathy, inguinal     8. MRSA (methicillin resistant staph aureus) culture positive     9. Fungal infection             4/3/19 right leg       5/29/19 right leg            Are any of these wounds new today: No; Location: na    Assessment/Plan:          1. Debridement: After discussion of risk factors and verbal consent was obtained 2% Lidocaine HCL jelly was applied, under clean conditions, the scaling on the shin were debrided using currette. Devitalized and nonviable tissue, along with any fibrin and slough, was removed to improve granulation tissue formation, stimulate wound healing, decrease overall bacteria load, disrupt biofilm formation and decrease edge senescence.  Total excisional debridement was 20 sq cm from the epidermis/dermis area with a depth of 0 cm.   Ulcers were improved afterwards and .  Measures were as noted on the flow sheet.             2. Edema: Venous insufficiency was +; will refer to Dr. Silveira in 3 months; we will continue the tubular compression, will order comrpession stocking through [employer]. The compression wraps were applied today in clinic.           3.  Wound treatment: stop the ointments; no dressings needed           4. Nutrition: focus on protein           5. Offloading: na            6. Right hip pain: history of significant trauma to the right leg and hip; continues to worsen;  [facility]          7. Lymphadenopathy: will repeat u/s in 3 months to check on the enlarged lymph nodes in the right groin; most likely this is just reactive     Patient will follow up with Dr. Silveira for reevaluation. They were instructed to call the clinic sooner with any signs or symptoms of infection or any further questions/concerns. Answered all questions.    No Zuluaga DNP, RN, CNP, [facility]  [phone]        This note was electronically signed by No Zuluaga

## 2021-06-27 NOTE — PROGRESS NOTES
Progress Notes by Delvis Silveira MD at 7/24/2019 10:20 AM     Author: Delvis Silveira MD Service: -- Author Type: Physician    Filed: 7/24/2019 10:41 AM Encounter Date: 7/24/2019 Status: Signed    : Delvis Silveira MD (Physician)       [facility] Vein Consult      Assessment:     1. varicose veins, bilateral   2. venous stasis ulcer, right   3. Insuffiencey of right greater saphenous vein     Plan:     1. Treatment options of conservative therapy of stockings use, exercise, weight loss,  elevating legs when possible.    2. Script for compression stockings 20-30 mm hg  3. Ultrasound to evaluate legs for incompetency of both deep and superficial system .   4. Surgical treatment   Endovenous closure ofright, greater saphenous vein   Risks and benefits of surgical intervention including infection, burns, dvt,  thrombophlebitis, not closing, recurrence, numbness and nerve injury and need  for further intervention were all discused    5. Follow up: for procedure .   6. Call for any questions concerns or issues    Subjective:      Jen Willingham is a 74 y.o. female  who was referred by Ashley Chavez MD  for evaluation of varicose veins. Symptoms include pain, aching, fatigue, burning, edema, dermatitis and long term ulcer of right lower leg. Also has history of cellulitis and fungal infection that was treated. Patient has history of leg selling, pain and vein issues that have progressed. Pain and symptoms have affected daily living and work activities needing medications. Here for evaluation today. Stocking use with compression stockings of 20-30 mm hg or greater for greater then 3 months    Allergies:Hydroxychloroquine and Latanoprost    Past Medical History:   Diagnosis Date   ? Abnormal thyroid function test 11/19/2012   ? Allergic rhinitis due to other allergen 3/27/2019   ? Asymptomatic postmenopausal status 3/27/2019    Overview:         stopped HRT in 2002, menopause 1996 - age 51 Problem list name  updated by automated process. Provider to review   ? Essential hypertension 3/27/2019    Overview:  Normal stress ECHO 12/2001 Labile - good control generally, but times when increases for a few days to 160-200/'s - see ED visit Problem list name updated by automated process. Provider to review   ? Hypothyroidism 11/23/2012   ? Other osteoporosis 3/27/2019   ? Sjoegren syndrome    ? 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 APPENDECTOMY  2001       Current Outpatient Medications   Medication Sig   ? amLODIPine (NORVASC) 2.5 MG tablet Take 2.5 mg by mouth.   ? aspirin 81 MG EC tablet Take 81 mg by mouth.   ? biotin 5,000 mcg TbDL Take by mouth.   ? calcium-vitamin D 500 mg(1,250mg) -200 unit per tablet Take by mouth.   ? coenzyme Q10 100 mg capsule Take 100 mg by mouth.   ? levothyroxine (SYNTHROID, LEVOTHROID) 75 MCG tablet Take 75 mcg by mouth.   ? losartan (COZAAR) 100 MG tablet 1 tab(s) once a day orally   ? multivitamin therapeutic tablet Take by mouth.   ? omega-3 acid ethyl esters (LOVAZA) 1 gram capsule Take 4 g by mouth.   ? timolol maleate (TIMOPTIC) 0.5 % ophthalmic solution INSTILL ONE DROP IN EACH EYE IN THE MORNING   ? turmeric root extract 500 mg cap Take by mouth.   ? clotrimazole (LOTRIMIN) 1 % cream Apply to irritated areas of skin twice a day until healed.  Do not apply to open ulcers.       Family History   Problem Relation Age of Onset   ? Arthritis Mother    ? Osteoarthritis Mother    ? Varicose Veins Mother    ? Heart disease Father    ? Coronary artery disease Sister         reports that she has never smoked. She has never used smokeless tobacco. She reports that she drinks alcohol. She reports that she does not use drugs.      Review of Systems  Pertinent items are noted in HPI.  A 12 point comprehensive review of systems was negative except as  noted.      Objective:     Vitals:    07/24/19 0938   BP: 158/86   Pulse: 76   Resp: 12   Temp: 98.8  F (37.1  C)   TempSrc: Oral     There is no height or weight on file to calculate BMI.    EXAM:  GENERAL: This is a well-developed 74 y.o. female who appears her stated age  HEAD: normocephalic  HEENT: Pupils equal and reactive bilaterally  MOUTH: mucus membranes intact. Normal dentation  CARDIAC: RRR without murmur  CHEST/LUNG:  Clear to auscultation bilaterally  ABDOMEN: Soft, nontender, nondistended, no masses noted   NEUROLOGIC: Focally intact, nonfocal, alert and oriented x 3  INTEGUMENT: No open lesions or ulcers  VASCULAR: Pulses intact, symmetrical upper and lower extremities. There areskin changes consistent with chronic venous insufficiency. Varicose veins present in bilateral greater saphenous distribution. Spider veins present bilateral. Healed ulcer now but continued erythema and skin changes of right leg.                 Imaging:    US Venous Insufficiency Leg Right (Order [id number])   Imaging   Date: 4/3/2019 Department: [facility] Ultrasound [place] Released By: Christos Farias, MEIMS, RVT Authorizing: No Zuluaga NP   Study Result     [facility] OUTPATIENT     DATE: 4/3/2019     EXAM: RIGHT LOWER EXTREMITY DEEP AND SUPERFICIAL VENOUS DUPLEX ULTRASOUND WITH PHYSIOLOGIC TESTING     INDICATION: Symptomatic varicose veins. Assess for incompetent veins.     TECHNIQUE: Supine and upright ultrasound of the deep and superficial veins with Valsalva and compression augmentation maneuvers. Duplex imaging is performed utilizing gray-scale, two-dimensional images, color-flow imaging, Doppler waveform analysis, and   spectral Doppler imaging.      INCOMPETENCY CRITERIA: Deep vein reflux reported when greater than 1,000 ms flow reversal.  Superficial vein reflux reported when greater than 500 ms flow reversal.  vein reflux reported as greater than 350 ms flow  reversal.     DEEP VEIN FINDINGS:     RIGHT LEG: The common femoral, profunda femoral, femoral, popliteal, and visualized calf veins are patent and compressible.  No deep venous reflux is noted.     RIGHT SUPERFICIAL VEIN FINDINGS:  GREAT SAPHENOUS VEIN: Superficial venous reflux is noted within the greater saphenous vein from the saphenofemoral junction to the mid calf. The vein measures 9 mm at the saphenofemoral junction. The vein measures 7-8 mm throughout the remaining portions   of the greater saphenous vein.     SMALL SAPHENOUS VEIN: Competent from the saphenopopliteal junction to the mid calf.        Mildly prominent lymph nodes are noted within the groin measuring up to 2.3 x 0.9 cm with normal morphology and preservation of the fatty clayton.     IMPRESSION:   CONCLUSION:   1.  No deep venous thrombosis of the right lower extremity.  2.  RIGHT LEG: Superficial venous reflux, as described.         Delvis Silveira MD  [facility].

## 2021-06-27 NOTE — PROGRESS NOTES
Progress Notes by Edelmira Houston LPN at 9/4/2019  1:20 PM     Author: Edelmira Houston LPN Service: -- Author Type: Licensed Nurse    Filed: 9/4/2019  2:13 PM Encounter Date: 9/4/2019 Status: Signed    : Edelmira Houston LPN (Licensed Nurse)                     Nurse Visit    Chief Complaint: Patient presents to clinic for assement, and treatment of their ulcer and and swelling    Dressing on Arrival patient cut off 2 layer compression. Dressing on arrival was a ace wrap on right leg.     Allergies:   Allergies   Allergen Reactions   ? Hydroxychloroquine Hives   ? Latanoprost Hives       Medications:   Current Outpatient Medications:   ?  amLODIPine (NORVASC) 2.5 MG tablet, Take 2.5 mg by mouth., Disp: , Rfl:   ?  aspirin 81 MG EC tablet, Take 81 mg by mouth., Disp: , Rfl:   ?  biotin 5,000 mcg TbDL, Take by mouth., Disp: , Rfl:   ?  calcium-vitamin D 500 mg(1,250mg) -200 unit per tablet, Take by mouth., Disp: , Rfl:   ?  clotrimazole (LOTRIMIN) 1 % cream, Apply to irritated areas of skin twice a day until healed.  Do not apply to open ulcers., Disp: 45 g, Rfl: 0  ?  coenzyme Q10 100 mg capsule, Take 100 mg by mouth., Disp: , Rfl:   ?  levothyroxine (SYNTHROID, LEVOTHROID) 75 MCG tablet, Take 75 mcg by mouth., Disp: , Rfl:   ?  losartan (COZAAR) 100 MG tablet, 1 tab(s) once a day orally, Disp: , Rfl: 3  ?  multivitamin therapeutic tablet, Take by mouth., Disp: , Rfl:   ?  omega-3 acid ethyl esters (LOVAZA) 1 gram capsule, Take 4 g by mouth., Disp: , Rfl:   ?  timolol maleate (TIMOPTIC) 0.5 % ophthalmic solution, INSTILL ONE DROP IN EACH EYE IN THE MORNING, Disp: , Rfl: 6  ?  turmeric root extract 500 mg cap, Take by mouth., Disp: , Rfl:     Vital Signs: /80   Pulse 88   Temp 98  F (36.7  C) (Oral)       Assessment:    General:  Patient presents to clinic in no apparent distress.  Psychiatric:  Alert and oriented x3.   Lower extremity:  edema is present.    Integumentary:  Skin is uniformly  warm, dry and pink.    Wound size:   VASC Wound 19 right leg (Active)   Pre Size Length 7.8 2019  2:00 PM   Pre Size Width 9.6 2019  2:00 PM   Pre Size Depth 0.1 2019  2:00 PM   Pre Total Sq cm 0 2019  9:00 AM   Description weeping 2019  9:00 AM       VASC Wound 19 right medial ankle (Active)      Undermining is not present.    The periwoundskin is or warmth      Plan:         1. Patient will follow up in 1 weeks         2. Treatment provided will include irrigation and dressings to promote autolytic debridement and will be as listed below     Cleansed with: Normal Saline    Protected skin with: none    Dressings Applied: foam, sorbact, sof-rol     Compression Applied to the Left Leg: None    Compression Applied to the Right Le-Layer Coban    Offloading applied: None    Trial Products: no  Provider notified regarding concerns: yes  Treatment Changes: yes added sorbact and sof-rol    Educational Barriers: none  Taught Regarding: Activity, Compliance, Compression and Dressing  Teaching Method: Explanation and DC sheet     Compression Applied to Right  2-Layer Coban: I Applied the inner foam layer with the foot dorsiflexed and started atthe base of the fifth metatarsal head. I left the bottom of the heel exposed, and proceed by winding the foam up the leg using minimal overlap to just below the fibular head. I then applied the compression layer with the foot dorsiflexed and startingat the base of the fifth metatarsal head. I applied at full stretch and proceeded up the leg using 50% overlap. The bottom of the heel is covered with the compression layer up to the end at the fibular head just below the back of the knee and levelwith the top edge of the foam layer.  I gently pressed and conformed the entire surface of the system to ensurethat the two layers are firmly bound together

## 2021-06-28 NOTE — PROGRESS NOTES
Progress Notes by No Zuluaga NP at 10/1/2019  8:20 AM     Author: No Zuluaga NP Service: -- Author Type: Nurse Practitioner    Filed: 10/7/2019  7:40 AM Encounter Date: 10/1/2019 Status: Addendum    : No Zuluaga NP (Nurse Practitioner)    Related Notes: Original Note by No Zuluaga NP (Nurse Practitioner) filed at 10/1/2019  9:16 AM       Follow up Vascular Visit       Date of Service:10/1/2019    Date Last Seen: 5/29/2019; 9/10/2019    Chief Complaint: right leg weeping rash; itching        Pt returns to the [facility] with regards to their right leg weeping and scaling. Last seen by me 5/2019 at that time we referred her to Dr. Silveira for RFA; this was completed 8/2019; she has recovered well with this. Was told to wear compression stocking; she had been doing this however she had to stop when the weeping began a few days ago. She arrives today with ace wraps on.  She reports no pain in the leg; the weeping began a few days ago; she has been applying bactroban and abd to the area changing twice per day. She denies fevers, chills. Reports intense itching in the area.     Allergies: Hydroxychloroquine and Latanoprost    Medications:   Current Outpatient Medications:   ?  amLODIPine (NORVASC) 2.5 MG tablet, Take 2.5 mg by mouth., Disp: , Rfl:   ?  aspirin 81 MG EC tablet, Take 81 mg by mouth., Disp: , Rfl:   ?  biotin 5,000 mcg TbDL, Take by mouth., Disp: , Rfl:   ?  calcium-vitamin D 500 mg(1,250mg) -200 unit per tablet, Take by mouth., Disp: , Rfl:   ?  coenzyme Q10 100 mg capsule, Take 100 mg by mouth., Disp: , Rfl:   ?  levothyroxine (SYNTHROID, LEVOTHROID) 75 MCG tablet, Take 75 mcg by mouth., Disp: , Rfl:   ?  losartan (COZAAR) 100 MG tablet, 1 tab(s) once a day orally, Disp: , Rfl: 3  ?  multivitamin therapeutic tablet, Take by mouth., Disp: , Rfl:   ?  omega-3 acid ethyl esters (LOVAZA) 1 gram capsule, Take 4 g by mouth.,  Disp: , Rfl:   ?  timolol maleate (TIMOPTIC) 0.5 % ophthalmic solution, INSTILL ONE DROP IN EACH EYE IN THE MORNING, Disp: , Rfl: 6  ?  turmeric root extract 500 mg cap, Take by mouth., Disp: , Rfl:     History:   Past Medical History:   Diagnosis Date   ? Abnormal thyroid function test 11/19/2012   ? Allergic rhinitis due to other allergen 3/27/2019   ? Asymptomatic postmenopausal status 3/27/2019    Overview:         stopped HRT in 2002, menopause 1996 - age 51 Problem list name updated by automated process. Provider to review   ? Essential hypertension 3/27/2019    Overview:  Normal stress ECHO 12/2001 Labile - good control generally, but times when increases for a few days to 160-200/'s - see ED visit Problem list name updated by automated process. Provider to review   ? Hypothyroidism 11/23/2012   ? Other osteoporosis 3/27/2019   ? Sjoegren syndrome    ? 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  Head: normocephalic atraumatic  Psychiatric:  Alert and oriented x3.   Respiratory: unlabored breathing; no cough  Integumentary:  Skin is uniformly warm, dry and pink.    Wound #1 Location: right medial ankl  Size: 5L x 5W x 0depth.  No sinus tract present, Wound base: scaling; weeping; inflammation  No undermining present. Wound is partial thickness. There is moderate drainage. Periwound: no denudement, erythema, induration, maceration or warmth.      Circumferential volume measures:    Vasc Edema 4/3/2019 9/4/2019 10/1/2019   Right just above MTP 18.7 18.5 18.7   Right Ankle 20.5 21.8 19   Right Widest Calf 35 28.8 28.8   Left - just above MTP 19 - -   Left Ankle 17.5 - -   Left Widest Calf 29.3 - -       Ulceration(s)/Wound(s):     VASC Wound 04/03/19 right leg (Active)   Pre Size  Length 2 9/10/2019 10:00 AM   Pre Size Width 2 9/10/2019 10:00 AM   Pre Size Depth 0.1 9/10/2019 10:00 AM   Pre Total Sq cm 4 9/10/2019 10:00 AM   Prodcut Used ABD Pad;Bactroban 9/10/2019 10:00 AM       VASC Wound 08/21/19 right medial ankle (Active)   Description pink,less o/a's and weeping 9/10/2019 10:00 AM   Prodcut Used ABD Pad;Bactroban 9/10/2019 10:00 AM        Lab Values    No results found for: SEDRATE  Lab Results   Component Value Date    CREATININE 0.74 10/10/2018     No results found for: HGBA1C  Lab Results   Component Value Date    BUN 15 10/10/2018     Lab Results   Component Value Date    ALBUMIN 4.5 06/08/2018     Vitamin D, Total (25-Hydroxy)   Date Value Ref Range Status   11/21/2016 52.6 30.0 - 80.0 ng/mL Final             Impression:  1. Chronic skin ulcer, limited to breakdown of skin (H)     2. Venous insufficiency of right leg     3. Varicose veins of right lower extremity with ulcer of calf limited to breakdown of skin (H)     4. Symptomatic varicose veins of right lower extremity     5. Chronic venous hypertension, right     6. MRSA (methicillin resistant staph aureus) culture positive         9/10/19 right medial leg             Are any of these wounds new today: No; Location: na    Assessment/Plan:          1. Debridement: After discussion of risk factors and verbal consent was obtained 2% Lidocaine HCL jelly was applied, under clean conditions, the right medial ankle ulceration(s) were debrided using currette. Devitalized and nonviable tissue, along with any fibrin and slough, was removed to improve granulation tissue formation, stimulate wound healing, decrease overall bacteria load, disrupt biofilm formation and decrease edge senescence.  Total excisional debridement was 25 sq cm from the epidermis/dermis area with a depth of 0 cm.   Ulcers were improved afterwards and .  Measures were unchanged after debridement.     2.  Wound treatment: wound treatment will include irrigation  and dressings to promote autolytic debridement which will include:obtained anaerobic, aerobic culture; as well as skin sample for tyson testing; will have her use bactroban and tcm ointments two times a day for 10 days; cover with viscopaste; abd; rolled gauze; will order supplies; sent rx for ointments to her pharmacy ADDENDUM: culture grew  Out pseudomonas will add on gentamcyin.            3. Edema: will stop the ace; and go to tubular compression and short stretch. The compression wraps were applied today in clinic.              Compression Applied: Short Stretch           4. Nutrition: na           5. Offloading: na     Patient will follow up with me in 2-3 weeks for reevaluation. They were instructed to call the clinic sooner with any signs or symptoms of infection or any further questions/concerns. Answered all questions.    No Zuluaga DNP, RN, CNP, [facility] [place]  [phone]        This note was electronically signed by No Zuluaga

## 2021-06-28 NOTE — PROGRESS NOTES
Progress Notes by No Zuluaga NP at 10/23/2019  9:20 AM     Author: No Zuluaga NP Service: -- Author Type: Nurse Practitioner    Filed: 10/23/2019 10:04 AM Encounter Date: 10/23/2019 Status: Signed    : No Zuluaga NP (Nurse Practitioner)       Follow up Vascular Visit       Date of Service:10/23/2019    Date Last Seen: 10/7/2019; 10/4/2019    Chief Complaint: right leg rash and weeping        Pt returns to [facility] with regards to their right leg rash and weeping. Arrives alone. Recently underwent RFA with Dr. Silveira. Later developed weeping to the right medial leg; we started her on bact and tcm ointment; culture was obtained; grew out pseudomonas; added gent oint along with viscopast she reports this cleared up within days of starting treatment; wrapping with short stretch. Denies fevers, chills. Also send LAZARO this was neg.      Allergies: Hydroxychloroquine; Latanoprost; and Other environmental allergy    Medications:   Current Outpatient Medications:   ?  amLODIPine (NORVASC) 2.5 MG tablet, Take 2.5 mg by mouth., Disp: , Rfl:   ?  aspirin 81 MG EC tablet, Take 81 mg by mouth., Disp: , Rfl:   ?  biotin 5,000 mcg TbDL, Take by mouth., Disp: , Rfl:   ?  calcium-vitamin D 500 mg(1,250mg) -200 unit per tablet, Take by mouth., Disp: , Rfl:   ?  coenzyme Q10 100 mg capsule, Take 100 mg by mouth., Disp: , Rfl:   ?  gentamicin (GARAMYCIN) 0.1 % ointment, Apply topically to wound daily, Disp: 15 g, Rfl: 0  ?  levothyroxine (SYNTHROID, LEVOTHROID) 75 MCG tablet, Take 75 mcg by mouth., Disp: , Rfl:   ?  losartan (COZAAR) 100 MG tablet, 1 tab(s) once a day orally, Disp: , Rfl: 3  ?  multivitamin therapeutic tablet, Take by mouth., Disp: , Rfl:   ?  mupirocin (BACTROBAN) 2 % ointment, Apply topically to wound twice per day, Disp: 30 g, Rfl: 0  ?  omega-3 acid ethyl esters (LOVAZA) 1 gram capsule, Take 4 g by mouth., Disp: , Rfl:   ?  timolol maleate (TIMOPTIC) 0.5 % ophthalmic  solution, INSTILL ONE DROP IN EACH EYE IN THE MORNING, Disp: , Rfl: 6  ?  triamcinolone (KENALOG) 0.5 % ointment, Apply to right leg rash two times a day for 10 days, Disp: 30 g, Rfl: 0  ?  turmeric root extract 500 mg cap, Take by mouth., Disp: , Rfl:     History:   Past Medical History:   Diagnosis Date   ? Abnormal thyroid function test 11/19/2012   ? Allergic rhinitis due to other allergen 3/27/2019   ? Asymptomatic postmenopausal status 3/27/2019    Overview:         stopped HRT in 2002, menopause 1996 - age 51 Problem list name updated by automated process. Provider to review   ? Essential hypertension 3/27/2019    Overview:  Normal stress ECHO 12/2001 Labile - good control generally, but times when increases for a few days to 160-200/'s - see ED visit Problem list name updated by automated process. Provider to review   ? Hypothyroidism 11/23/2012   ? Other osteoporosis 3/27/2019   ? Sjoegren syndrome    ? 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  Head: normocephalic atraumatic  Psychiatric:  Alert and oriented x3.   Respiratory: unlabored breathing; no cough  Integumentary:  Skin is uniformly warm, dry and pink.    Extremities; no swelling in the limbs; rash has cleared; skin intact; no weeping to the right medial leg    Circumferential volume measures:    Vasc Edema 4/3/2019 9/4/2019 10/1/2019 10/23/2019   Right just above MTP 18.7 18.5 18.7 18.5   Right Ankle 20.5 21.8 19 19   Right Widest Calf 35 28.8 28.8 28   Left - just above MTP 19 - - -   Left Ankle 17.5 - - -   Left Widest Calf 29.3 - - -       Ulceration(s)/Wound(s):     VASC Wound 04/03/19 right leg (Active)   Pre Size Length 2 9/10/2019 10:00 AM   Pre Size Width 2 9/10/2019 10:00 AM   Pre Size Depth 0.1 9/10/2019 10:00 AM    Pre Total Sq cm 4 9/10/2019 10:00 AM   Prodcut Used ABD Pad;Bactroban 9/10/2019 10:00 AM       VASC Wound 08/21/19 right medial ankle (Active)   Description pink,less o/a's and weeping 9/10/2019 10:00 AM   Prodcut Used ABD Pad;Bactroban 9/10/2019 10:00 AM        Lab Values    No results found for: SEDRATE  Lab Results   Component Value Date    CREATININE 0.74 10/10/2018     No results found for: HGBA1C  Lab Results   Component Value Date    BUN 15 10/10/2018     Lab Results   Component Value Date    ALBUMIN 4.5 06/08/2018     Vitamin D, Total (25-Hydroxy)   Date Value Ref Range Status   11/21/2016 52.6 30.0 - 80.0 ng/mL Final             Impression:  1. Chronic skin ulcer, limited to breakdown of skin (H)     2. Venous insufficiency of right leg     3. Varicose veins of right lower extremity with ulcer of calf limited to breakdown of skin (H)     4. Symptomatic varicose veins of right lower extremity     5. Chronic venous hypertension, right     6. MRSA (methicillin resistant staph aureus) culture positive            10/23/2019 right leg       9/10/19 right leg            Are any of these wounds new today: No; Location: na    Assessment/Plan:          1. Debridement: na     2.  Wound treatment: wound treatment will include irrigation and dressings to promote autolytic debridement which will include:has responded beautifully to the treatment; will stop all ointments; stop all dressing; errol           3. Edema: go back to compression stockings; she did not bring this to her appt so we applied double tubular compression. The compression wraps were applied today in clinic.           4. Nutrition: na           5. Offloading: na     Patient will follow up with me PRN for reevaluation. They were instructed to call the clinic sooner with any signs or symptoms of infection or any further questions/concerns. Answered all questions.    No Zuluaga DNP, RN, CNP, CWOCN, CFCN, CLT  [facility]    [phone]        This note was electronically signed by No Zuluaga

## 2021-07-03 NOTE — ADDENDUM NOTE
Addendum Note by No Yeung NP at 10/1/2019  8:20 AM     Author: No Yeung NP Service: -- Author Type: Nurse Practitioner    Filed: 10/7/2019  7:40 AM Encounter Date: 10/1/2019 Status: Signed    : No Yeung NP (Nurse Practitioner)    Addended by: NO YEUNG on: 10/7/2019 07:40 AM        Modules accepted: Orders

## 2022-01-01 ENCOUNTER — TRANSCRIBE ORDERS (OUTPATIENT)
Dept: VASCULAR SURGERY | Facility: CLINIC | Age: 77
End: 2022-01-01
Payer: MEDICARE

## 2022-01-01 ENCOUNTER — OFFICE VISIT (OUTPATIENT)
Dept: VASCULAR SURGERY | Facility: CLINIC | Age: 77
End: 2022-01-01
Attending: PHYSICIAN ASSISTANT
Payer: COMMERCIAL

## 2022-01-01 ENCOUNTER — LAB REQUISITION (OUTPATIENT)
Dept: LAB | Facility: CLINIC | Age: 77
End: 2022-01-01

## 2022-01-01 ENCOUNTER — HOSPITAL ENCOUNTER (EMERGENCY)
Facility: HOSPITAL | Age: 77
End: 2022-05-31
Attending: STUDENT IN AN ORGANIZED HEALTH CARE EDUCATION/TRAINING PROGRAM | Admitting: STUDENT IN AN ORGANIZED HEALTH CARE EDUCATION/TRAINING PROGRAM
Payer: COMMERCIAL

## 2022-01-01 VITALS — TEMPERATURE: 98 F | SYSTOLIC BLOOD PRESSURE: 144 MMHG | HEART RATE: 84 BPM | DIASTOLIC BLOOD PRESSURE: 80 MMHG

## 2022-01-01 DIAGNOSIS — R60.9 DEPENDENT EDEMA: Primary | ICD-10-CM

## 2022-01-01 DIAGNOSIS — I10 ESSENTIAL (PRIMARY) HYPERTENSION: ICD-10-CM

## 2022-01-01 DIAGNOSIS — M79.89 LEG SWELLING: ICD-10-CM

## 2022-01-01 DIAGNOSIS — E03.9 HYPOTHYROIDISM, UNSPECIFIED: ICD-10-CM

## 2022-01-01 DIAGNOSIS — M79.89 LEG SWELLING: Primary | ICD-10-CM

## 2022-01-01 DIAGNOSIS — I46.9 CARDIAC ARREST (H): ICD-10-CM

## 2022-01-01 DIAGNOSIS — I87.303 VENOUS HYPERTENSION OF BOTH LOWER EXTREMITIES: ICD-10-CM

## 2022-01-01 DIAGNOSIS — I87.2 VENOUS (PERIPHERAL) INSUFFICIENCY: ICD-10-CM

## 2022-01-01 LAB
ACANTHOCYTES BLD QL SMEAR: ABNORMAL
ALBUMIN SERPL-MCNC: 3.3 G/DL (ref 3.5–5)
ALP SERPL-CCNC: 94 U/L (ref 45–120)
ALT SERPL W P-5'-P-CCNC: 82 U/L (ref 0–45)
ANION GAP SERPL CALCULATED.3IONS-SCNC: 13 MMOL/L (ref 5–18)
ANION GAP SERPL CALCULATED.3IONS-SCNC: 20 MMOL/L (ref 5–18)
AST SERPL W P-5'-P-CCNC: 108 U/L (ref 0–40)
BASOPHILS # BLD MANUAL: 0.1 10E3/UL (ref 0–0.2)
BASOPHILS NFR BLD MANUAL: 1 %
BILIRUB SERPL-MCNC: 0.5 MG/DL (ref 0–1)
BUN SERPL-MCNC: 18 MG/DL (ref 8–28)
BUN SERPL-MCNC: 9 MG/DL (ref 8–28)
BURR CELLS BLD QL SMEAR: ABNORMAL
CALCIUM SERPL-MCNC: 9.4 MG/DL (ref 8.5–10.5)
CALCIUM SERPL-MCNC: 9.7 MG/DL (ref 8.5–10.5)
CHLORIDE BLD-SCNC: 88 MMOL/L (ref 98–107)
CHLORIDE BLD-SCNC: 99 MMOL/L (ref 98–107)
CO2 SERPL-SCNC: 13 MMOL/L (ref 22–31)
CO2 SERPL-SCNC: 21 MMOL/L (ref 22–31)
CREAT SERPL-MCNC: 0.67 MG/DL (ref 0.6–1.1)
CREAT SERPL-MCNC: 1.32 MG/DL (ref 0.6–1.1)
EOSINOPHIL # BLD MANUAL: 0.1 10E3/UL (ref 0–0.7)
EOSINOPHIL NFR BLD MANUAL: 1 %
ERYTHROCYTE [DISTWIDTH] IN BLOOD BY AUTOMATED COUNT: 14.4 % (ref 10–15)
GFR SERPL CREATININE-BSD FRML MDRD: 42 ML/MIN/1.73M2
GFR SERPL CREATININE-BSD FRML MDRD: >90 ML/MIN/1.73M2
GLUCOSE BLD-MCNC: 325 MG/DL (ref 70–125)
GLUCOSE BLD-MCNC: 96 MG/DL (ref 70–125)
HCT VFR BLD AUTO: 32 % (ref 35–47)
HGB BLD-MCNC: 10.3 G/DL (ref 11.7–15.7)
LYMPHOCYTES # BLD MANUAL: 3.6 10E3/UL (ref 0.8–5.3)
LYMPHOCYTES NFR BLD MANUAL: 44 %
MCH RBC QN AUTO: 31.4 PG (ref 26.5–33)
MCHC RBC AUTO-ENTMCNC: 32.2 G/DL (ref 31.5–36.5)
MCV RBC AUTO: 98 FL (ref 78–100)
METAMYELOCYTES # BLD MANUAL: 0.3 10E3/UL
METAMYELOCYTES NFR BLD MANUAL: 4 %
MONOCYTES # BLD MANUAL: 0.5 10E3/UL (ref 0–1.3)
MONOCYTES NFR BLD MANUAL: 6 %
MYELOCYTES # BLD MANUAL: 0.1 10E3/UL
MYELOCYTES NFR BLD MANUAL: 1 %
NEUTROPHILS # BLD MANUAL: 3.5 10E3/UL (ref 1.6–8.3)
NEUTROPHILS NFR BLD MANUAL: 43 %
NRBC # BLD AUTO: 0.1 10E3/UL
NRBC BLD MANUAL-RTO: 1 %
PLAT MORPH BLD: ABNORMAL
PLATELET # BLD AUTO: 183 10E3/UL (ref 150–450)
POTASSIUM BLD-SCNC: 3.8 MMOL/L (ref 3.5–5)
POTASSIUM BLD-SCNC: 3.9 MMOL/L (ref 3.5–5)
PROT SERPL-MCNC: 6.6 G/DL (ref 6–8)
RBC # BLD AUTO: 3.28 10E6/UL (ref 3.8–5.2)
RBC MORPH BLD: ABNORMAL
SODIUM SERPL-SCNC: 121 MMOL/L (ref 136–145)
SODIUM SERPL-SCNC: 133 MMOL/L (ref 136–145)
TROPONIN I SERPL-MCNC: 3.1 NG/ML (ref 0–0.29)
TSH SERPL DL<=0.005 MIU/L-ACNC: 0.73 UIU/ML (ref 0.3–5)
WBC # BLD AUTO: 8.1 10E3/UL (ref 4–11)

## 2022-01-01 PROCEDURE — 99285 EMERGENCY DEPT VISIT HI MDM: CPT | Mod: 25

## 2022-01-01 PROCEDURE — G0463 HOSPITAL OUTPT CLINIC VISIT: HCPCS | Performed by: NURSE PRACTITIONER

## 2022-01-01 PROCEDURE — 999N000157 HC STATISTIC RCP TIME EA 10 MIN

## 2022-01-01 PROCEDURE — 85027 COMPLETE CBC AUTOMATED: CPT | Performed by: NURSE PRACTITIONER

## 2022-01-01 PROCEDURE — 36415 COLL VENOUS BLD VENIPUNCTURE: CPT | Performed by: NURSE PRACTITIONER

## 2022-01-01 PROCEDURE — 84443 ASSAY THYROID STIM HORMONE: CPT | Performed by: PHYSICIAN ASSISTANT

## 2022-01-01 PROCEDURE — 80048 BASIC METABOLIC PNL TOTAL CA: CPT | Performed by: PHYSICIAN ASSISTANT

## 2022-01-01 PROCEDURE — 80053 COMPREHEN METABOLIC PANEL: CPT | Performed by: NURSE PRACTITIONER

## 2022-01-01 PROCEDURE — 85007 BL SMEAR W/DIFF WBC COUNT: CPT | Performed by: NURSE PRACTITIONER

## 2022-01-01 PROCEDURE — 92950 HEART/LUNG RESUSCITATION CPR: CPT

## 2022-01-01 PROCEDURE — 99213 OFFICE O/P EST LOW 20 MIN: CPT | Performed by: NURSE PRACTITIONER

## 2022-01-01 PROCEDURE — 93308 TTE F-UP OR LMTD: CPT

## 2022-01-01 PROCEDURE — 84484 ASSAY OF TROPONIN QUANT: CPT | Performed by: NURSE PRACTITIONER

## 2022-01-01 PROCEDURE — 31500 INSERT EMERGENCY AIRWAY: CPT

## 2022-01-01 RX ORDER — MULTIVITAMIN/IRON/FOLIC ACID 18MG-0.4MG
1 TABLET ORAL DAILY
COMMUNITY

## 2022-01-01 RX ORDER — CHLORAL HYDRATE 500 MG
4 CAPSULE ORAL
COMMUNITY

## 2022-01-01 RX ORDER — TIMOLOL MALEATE 5 MG/ML
SOLUTION/ DROPS OPHTHALMIC
COMMUNITY
Start: 2022-01-01

## 2022-01-01 RX ORDER — LOSARTAN POTASSIUM 100 MG/1
100 TABLET ORAL
COMMUNITY

## 2022-01-01 ASSESSMENT — PAIN SCALES - GENERAL: PAINLEVEL: NO PAIN (0)

## 2022-01-31 NOTE — PROGRESS NOTES
Follow up Vascular Visit       Date of Service:01/31/22      Chief Complaint: worsening LLE edema      Pt returns to [facility] with regards to their worsening edema on the left leg.  They arrive today alone.  Last seen [id number]/43844 for her right leg at that time she had right leg edema and ulcers; found to have venous insufficiency. She reports that the swelling developed around the time her bp medications were adjusted. She recently was seen by PCP and they did some changing of her bp medications and is noting an improvement with her swelling. She was previously on 2.5mg of amlodipine; this was increased to 5mg; she noted increase in leg swelling; let her PCP know and this was reduced back down to 2.5mg. They are currently reporting no wounds. They are using nothing for compression; previously had compression stockings and velcro; she stopped wearing because she felt the leg was better. They are feeling well today. Denies fevers, chills. No shortness of breath. Previously underwent RFA with Dr. Silveira on the right side. She reports left leg pain only in the morning when she first goes to step down on the leg; feels stiff; also tender when she rubs the medial and lateral surface of the leg.      Allergies:   Allergies   Allergen Reactions     Hydroxychloroquine Hives     Latanoprost Hives     Other Environmental Allergy Itching     3M 2 layer wrap; developed intense itching; tolerated when cast padding was applied first       Medications:   Current Outpatient Medications:      amLODIPine (NORVASC) 2.5 MG tablet, Take 2 tablets (5 mg) by mouth daily, Disp: 60 tablet, Rfl: 6     ASPIRIN 81 MG OR TABS, 1 tab po QD (Once per day), Disp: 100, Rfl: 3     CALCIUM + D 600-200 MG-UNIT OR TABS, 1 TABLET TWICE DAILY, Disp: 60, Rfl: 0     CO Q-10 100 MG OR CAPS, 2 TABLET DAILY, Disp: 60, Rfl: 0     fish oil-omega-3 fatty acids 1000 MG capsule, Take 4 g by mouth, Disp: , Rfl:       fluticasone-salmeterol (ADVAIR) 100-50 MCG/DOSE inhaler, 1 puff Inhalation Twice a day, Disp: , Rfl:      levothyroxine (SYNTHROID, LEVOTHROID) 75 MCG tablet, Take 1 tablet (75 mcg) by mouth daily, Disp: 90 tablet, Rfl: 1     losartan (COZAAR) 100 MG tablet, Take 100 mg by mouth, Disp: , Rfl:      multivitamin w/minerals (CENTRUM ADULTS) tablet, Take 1 tablet by mouth daily, Disp: , Rfl:      timolol maleate (TIMOPTIC) 0.5 % ophthalmic solution, Instill one drop IN EACH EYE IN THE MORNING, Disp: , Rfl:      TURMERIC PO, , Disp: , Rfl:      VITAMIN B COMPLEX OR TABS, TAKE DAILY, Disp: , Rfl: 0    History:   Past Medical History:   Diagnosis Date     Abnormal thyroid function test 11/19/2012     Allergic rhinitis due to other allergen 3/27/2019     Asymptomatic postmenopausal status 3/27/2019    Overview:         stopped HRT in 2002, menopause 1996 - age 51 Problem list name updated by automated process. Provider to review     Epistaxis      Essential hypertension 3/27/2019    Overview:  Normal stress ECHO 12/2001 Labile - good control generally, but times when increases for a few days to 160-200/'s - see ED visit Problem list name updated by automated process. 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  Head: normocephalic atraumatic  Psychiatric:  Alert and oriented x3.   Respiratory: unlabored breathing; no cough  Integumentary:  Skin is uniformly warm, dry and pink.        Extremities: right leg no edema; +hemosiderosis and scarring; LLE; with +2 pitting edema; no weeping; no erythema; nails fungal well trimmed painted      Circumferential volume  measures:      Circumferential Measures 9/4/2019 10/1/2019 10/23/2019 1/31/2022   Right just above MTP 18.5 18.7 18.5 18.5   Right Ankle 21.8 19 19 18.5   Right Widest Calf 28.8 28.8 28 28   Right Knee to Ankle - - - 33   Left - just above MTP - - - 19.8   Left Ankle - - - 20.4   Left Widest Calf - - - 30   Left Knee to Ankle - - - 33       Labs:    I personally reviewed the following lab results today and those on care everywhere    No results found for: CRP   No results found for: SED   Last Renal Panel:  Sodium   Date Value Ref Range Status   01/19/2022 133 (L) 136 - 145 mmol/L Final   11/04/2014 126 (L) 133 - 144 mmol/L Final     Potassium   Date Value Ref Range Status   01/19/2022 3.9 3.5 - 5.0 mmol/L Final   11/04/2014 4.3 3.4 - 5.3 mmol/L Final     Chloride   Date Value Ref Range Status   01/19/2022 99 98 - 107 mmol/L Final   11/04/2014 94 94 - 109 mmol/L Final     Carbon Dioxide   Date Value Ref Range Status   11/04/2014 27 20 - 32 mmol/L Final     Carbon Dioxide (CO2)   Date Value Ref Range Status   01/19/2022 21 (L) 22 - 31 mmol/L Final     Anion Gap   Date Value Ref Range Status   01/19/2022 13 5 - 18 mmol/L Final   11/04/2014 5 3 - 14 mmol/L Final     Glucose   Date Value Ref Range Status   01/19/2022 96 70 - 125 mg/dL Final   11/04/2014 97 70 - 99 mg/dL Final     Comment:     Effective 7/30/2014, the reference range for this assay has changed to reflect   new instrumentation/methodology.       Urea Nitrogen   Date Value Ref Range Status   01/19/2022 9 8 - 28 mg/dL Final   11/04/2014 11 7 - 30 mg/dL Final     Comment:     Effective 7/30/2014, the reference range for this assay has changed to reflect   new instrumentation/methodology.       Creatinine   Date Value Ref Range Status   01/19/2022 0.67 0.60 - 1.10 mg/dL Final   11/04/2014 0.66 0.52 - 1.04 mg/dL Final     GFR Estimate   Date Value Ref Range Status   01/19/2022 >90 >60 mL/min/1.73m2 Final     Comment:     Effective December 21, 2021 eGFRcr in  adults is calculated using the 2021 CKD-EPI creatinine equation which includes age and gender (Chet dueñas al., [place], DOI: 10.1056/UQRHdd4067167)   04/07/2021 >60 >60 mL/min/1.73m2 Final   11/04/2014 89 >60 mL/min/1.7m2 Final     Comment:     Non  GFR Calc     Calcium   Date Value Ref Range Status   01/19/2022 9.7 8.5 - 10.5 mg/dL Final   11/04/2014 9.2 8.5 - 10.1 mg/dL Final     Comment:     Effective 7/30/2014, the reference range for this assay has changed to reflect   new instrumentation/methodology.       Albumin   Date Value Ref Range Status   04/07/2021 4.5 3.5 - 5.0 g/dL Final   11/04/2014 3.9 3.4 - 5.0 g/dL Final      Lab Results   Component Value Date    WBC 11.1 04/07/2021    WBC 4.9 09/18/2011     Lab Results   Component Value Date    RBC 3.90 04/07/2021    RBC 4.14 09/18/2011     Lab Results   Component Value Date    HGB 12.2 04/07/2021    HGB 12.6 09/18/2011     Lab Results   Component Value Date    HCT 36.8 04/07/2021    HCT 36.8 09/18/2011     No components found for: MCT  Lab Results   Component Value Date    MCV 94 04/07/2021    MCV 89 09/18/2011     Lab Results   Component Value Date    MCH 31.3 04/07/2021    MCH 30.4 09/18/2011     Lab Results   Component Value Date    MCHC 33.2 04/07/2021    MCHC 34.2 09/18/2011     Lab Results   Component Value Date    RDW 12.1 04/07/2021    RDW 12.3 09/18/2011     Lab Results   Component Value Date     04/07/2021     09/18/2011      No results found for: A1C   TSH   Date Value Ref Range Status   01/19/2022 0.73 0.30 - 5.00 uIU/mL Final   02/02/2015 0.30 (L) 0.40 - 4.00 mU/L Final     Comment:     Effective 7/30/2014, the reference range for this assay has changed to reflect   new instrumentation/methodology.        No results found for: VITDT                Impression:  Encounter Diagnoses   Name Primary?     Dependent edema Yes     Leg swelling      Venous hypertension of both lower extremities      Venous (peripheral) insufficiency                     Are any of these wounds new today: No; Location: na    Assessment/Plan:          1. Debridement: na     2.  Wound treatment: wound treatment will include irrigation and dressings to promote autolytic debridement which will include:no wounds; lotion daily     If for some reason the patient is not able to get their dressing(s) changed as outlined above (due to illness, lack of supplies, lack of help) please do the following: remove old, soiled dressings; wash the wounds with saline; pat dry; apply ABD pad or other absorbant pad and secure with rolled gauze; avoid tape directly on your skin; patient instructed to call the clinic as soon as possible to let us know what the current issues are in receiving wound care. Stable            3. Edema: swelling is worse on the left; most likely due to the dose adjustment in the amlodipine; also due to lapse of compression. We spoke about working the left leg up for venous insufficiency with ultrasound however since she is not having any ulcerations; I would like to conservatively manage her swelling with reducing the swelling down using short stretch for 1 week. She was in agreement. If she does develop ulcerations I would be more motivated to obtain the ultrasound. We spoke about the importance of wearing compression consistently every day and replacing every 4-6 months.       The compression wraps were applied today in clinic. If a 2 layer or 4 layer compression wrap is being used; these are safe to have on for ONLY 7 days. If for some reason the patient is not able to get the wrap(s) changed (due to illness; lack of supplies, lack of help, lack of transportation) please do the following: unwrap the old 2 or 4 layer compression wrap; avoid using scissors as you could cut your skin and cause wounds; use tubular compression when available. Call to reschedule your home care or clinic visit appointment as soon as possible.  Worsened            4. Nutrition:  healthy weight; low sodium diet           5. Offloading: na           6. LLE pain: her pain sounds typical of venous insufficiency and resolves quickly once she begins moving around in the morning; my suspicion for DVT is low; Karen's negative; I anticipate her pain to be improved once we get the swelling reduced     Patient will follow up with me PRN if she does not respond to treatment or has recurrence for reevaluation. They were instructed to call the clinic sooner with any signs or symptoms of infection or any further questions/concerns. Answered all questions.          No Zuluaga DNP, RN, CNP, CWOCN, CFCN, CLT  [facility]   [phone]        This note was electronically signed by No Zuluaga NP

## 2022-01-31 NOTE — PATIENT INSTRUCTIONS
We will have you wrap your left leg with tubular compression and short stretch  Re-wrap this 1-2 times per day; when you notice it is loosening up  Remove for bathing and then re-wrap    Transition into compression stockings once swelling is down.

## 2022-05-31 NOTE — ED PROVIDER NOTES
Emergency Department Encounter         FINAL IMPRESSION:  Cardiac arrest        ED COURSE AND MEDICAL DECISION MAKING   1:17 PM - 1:23 PM Patient arrives by EMS in cardiac arrest with CPR in progress. In room following ACLS protocol. See nurse CODE SHEET for times of medications and pulse checks.  1:23 PM Called time of death.  1:35 PM Spoke with and updated the family.    ED Course as of 05/31/22 1340   Tue May 31, 2022   1328 Patient is a 76-year-old female history of carotid artery stenosis, hypertension hyperlipidemia, who arrived via EMS as a cardiac arrest.  I spoke with EMS prior to the arrest as they had been working patient in the field for over 20 minutes.  Had had several rounds of PEA with an organized appearing rhythm on the monitor.  She did receive 4 rounds of epinephrine as well as bicarbonate and had no response.  She was becoming cyanotic.  However the decision was made to bring her here for further evaluation because of the organized appearing rhythm on the monitor.  On arrival, the room had been prepared for a cardiac arrest.  Patient was brought from the cot with continued CPR onto our table.  Patient was too small for a Mo machine.    2 minutes were completed.  Rhythm appeared to be PEA.  Definitive airway was placed.  Patient was bagging easily.  Normal color change.  At the postop rhythm check, patient had no pulses in both the femoral pulses or the carotid pulses.  Epinephrine was given.  Bicarb was given.  This was patient's fifth epinephrine as well as second bicarb.  At this point pupils were found to be fixed at approximate 3 mm.    Second line was initiated/placed in left tibia.  Left 18-gauge IV was also placed in the AC.  Physical examination at this point showing no significant head or neck trauma.  She was in a c-collar.  Abdomen was benign.  Patient had cyanosis of bilateral fingertips.  Mild mottling in the lower extremities.    2 minutes completed.  Pulse check rhythm check.   Patient appears to be in PEA again.  No pulses in the carotid or femoral.  Bedside ultrasound revealing no cardiac activity.  No pericardial effusion.  There was a significant amount of ventricular smoke which suggests clotting as well as in the atria.  This point with patient having CPR for over half an hour, multiple medications, as well as the likelihood of cognitive function after this type of event, time of death was called.     Critical Care     Performed by: Shaquille Cordova  Authorized by: Shaquille Cordova  Total critical care time: 30 minutes  Critical care was necessary to treat or prevent imminent or life-threatening deterioration of the following conditions: cardiac arrest  Critical care was time spent personally by me on the following activities: development of treatment plan with patient or surrogate, discussions with consultants, examination of patient, evaluation of patient's response to treatment, obtaining history from patient or surrogate, ordering and performing treatments and interventions, ordering and review of laboratory studies, ordering and review of radiographic studies, re-evaluation of patient's condition and monitoring for potential decompensation.  Critical care time was exclusive of separately billable procedures and treating other patients.               At the conclusion of the encounter I discussed the results of all the tests and the disposition. The questions were answered. The patient or family acknowledged understanding and was agreeable with the care plan.                MEDICATIONS GIVEN IN THE EMERGENCY DEPARTMENT:  Medications - No data to display    NEW PRESCRIPTIONS STARTED AT TODAY'S ED VISIT:  New Prescriptions    No medications on file       HPI     History limited secondary to acuity of condition.  Patient information obtained from: EMS    Use of Interpretor: N/A    Jen Willingham is a 76 year old female with a pertinent history of HTN who presents to this ED in cardiac arrest.  "Per EMS, patient was at a local grocery store today doing her shopping and was found down on the ground unresponsive by another customer at the store. Unknown down time. EMS was called at 12:26 PM. EMS arrived on scene at 12:30 PM and began CPR. They had \"no shock advised\" x4 on their AED. Patient received bicarb x 1 and epi x4 by EMS without response. EMS never had a palpable pulse. Brought in by EMS with CPR in progress. Patient too small for RAMILA device.        REVIEW OF SYSTEMS:  Unable to perform secondary to acuity of condition.      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     Drug use: No       amLODIPine (NORVASC) 2.5 MG tablet  ASPIRIN 81 MG OR TABS  CALCIUM + D 600-200 MG-UNIT OR TABS  CO Q-10 100 MG OR CAPS  fish oil-omega-3 fatty acids 1000 MG capsule  fluticasone-salmeterol (ADVAIR) 100-50 MCG/DOSE inhaler  levothyroxine (SYNTHROID, LEVOTHROID) 75 MCG tablet  losartan (COZAAR) 100 MG tablet  multivitamin w/minerals (CENTRUM ADULTS) tablet  timolol maleate (TIMOPTIC) 0.5 % ophthalmic solution  TURMERIC PO  VITAMIN B COMPLEX OR TABS          PHYSICAL EXAM     LMP 09/17/1997       PHYSICAL EXAM:    General: Unresponsive.  HEENT:  No head trauma.  C-collar in place  Cardiovascular: " asystolic  Respiratory: airway in place, bilateral clear lung sounds with bagging  Abdominal: Soft, no distention  Neurological: Alert and oriented, grossly neurologically intact.          RESULTS       Labs Ordered and Resulted from Time of ED Arrival to Time of ED Departure   CBC WITH PLATELETS AND DIFFERENTIAL - Abnormal       Result Value    WBC Count 8.1      RBC Count 3.28 (*)     Hemoglobin 10.3 (*)     Hematocrit 32.0 (*)     MCV 98      MCH 31.4      MCHC 32.2      RDW 14.4      Platelet Count 183     COMPREHENSIVE METABOLIC PANEL   TROPONIN I   BLOOD GAS VENOUS       No orders to display     PROCEDURE:    Emergency Department Limited Bedside Screening Cardiac Ultrasound   INDICATIONS: pulseless on exam (suspect asystole)   PROCEDURE PROVIDER: OHL   WINDOW AND FINDINGS:    SUB-XYPHOID     :      Cardiac activity: Asystole  Pericardial effusion: No clinically significant pericardial effusion  Signs of Tamponade physiology: Absent   PARASTERNAL    :  Cardiac activity: Asystole  Pericardial effusion: No clinically significant pericardial effusion  Signs of Tamponade physiology: Absent     IMAGES SAVED AND STORED FOR ARCHIVE AND REVIEW: No           PROCEDURES:  Procedures:  Procedures       I, Momo Cruz am serving as a scribe to document services personally performed by Shaquille Cordova DO, based on my observations and the provider's statements to me.  I, Shaquille Cordova DO, attest that Momo Cruz is acting in a scribe capacity, has observed my performance of the services and has documented them in accordance with my direction.    Shaquille Cordova DO  Emergency Medicine  [facility] EMERGENCY DEPARTMENT     Shaquille Cordova DO  05/31/22 1351       Shaquille Cordova DO  05/31/22 1359

## 2022-05-31 NOTE — ED NOTES
Timeline once patient arrived:  1320: ET tube placed by Dr Torres, 7.5, 24 at the teeth with good color change  1321: IO inserted to left leg  1321: Pulse check and rhythm check, PEA CPR resumed   1322: epi given  1322: bicarb given  1322: 18g IV to LAC  1323: Pulse check and rhythm check, no pulse and PEA noted. CPR resumed  1323: TIME OF DEATH CALLED BY DR LUCAS

## 2022-05-31 NOTE — ED PROVIDER NOTES
PROCEDURE: Rapid Sequence Intubation   INDICATIONS: Respiratory Failure/Cardiac arrest   PROCEDURE PROVIDER: Dr Margarita Torres   CONSENT: Consent for procedure was not obtained. Consent is implied given the emergent need.   PROCEDURE SPECIFIC CHECKLIST COMPLETED: Yes   TIME OUT: Universal protocol was followed. TIME OUT conducted just prior to starting procedure confirmed patient identity, site/side, procedure, patient position, and availability of correct equipment. Yes   MEDICATIONS: None   TUBE DETAILS: 7.5 tube, at 24 cm at the teeth   EQUIPMENT USED: Glidescope, size 4   POST-INTUBATION ASSESSMENT/NOTE: Difficulty of intubation:  Easy, straightforward    Post-intubation pulmonary exam:  equal and absent over the epigastrium    ET Tube placement was confirmed with:  auscultation with good, equal bilateral breath sounds, absence of breath sounds over the epigastrium, fog in the tube and colorimetric CO2 detector (good color change)    Monitoring consisted of:  heart rate, cardiac monitor, continuous pulse oximeter, continuous capnometry (end tidal CO2), frequent blood pressure checks, IV access, constant attendance by RN until patient is recovered and constant attendance by MD until patient is stable     COMPLICATIONS: No complications from this procedure that was done during cardiac arrest         Margarita Torres MD  05/31/22 [id number]

## 2022-05-31 NOTE — PROGRESS NOTES
SPIRITUAL HEALTH SERVICES NOTE  [facility]/ED    SPIRITUAL CARE NOTE  Notified by unit staff that family would like to see a  after Jen's death. Met with her sons Chilo Rousseau, Winston, and ANTONIETTA Emily. They share that Jen's  of 40 years just  on . He had MS and she was his caregiver for many years. They were not aware that Jen any chronic health issues, other than achy joints. Because their father  just 2 months ago, they hope to hold a double memorial service for both parents on . Jen has already picked out the tree that she wants her ashes buried under. Jen was connected to [employer] in [place] for many years. The family will notify the [employer] themselves. They deny any existential concerns at this time. Prayer shared. Her son Soham took her ring and earrings. I encouraged them to be patient with themselves and each other as they grieve.     Visit Length: 20 minutes    Plan of Care: Will remain available for further support as patient/family needs/desires.    Keila Hay M.Div.      Office: [phone] (for non-urgent requests)  Please Vocera or page through [facility] for time-sensitive requests

## 2022-05-31 NOTE — ED TRIAGE NOTES
Patient presents to ED for evaluation of cardiac arrest.  Per medics patient was at grocery store and was found down on ground by other shoppers, unsure of how long patient was down and unresponsive.  Call time was at 1226, fire arrived at 1230 and CPR began. Per fire advised for no shock x4.  4 rounds of epi given by medics once they arrived, also one round of bicarb given. Patient then was in vfib, one shock delivered. Back into PEA. Last epi given about 5 minutes PTA.